# Patient Record
Sex: FEMALE | Race: WHITE | NOT HISPANIC OR LATINO | Employment: FULL TIME | URBAN - NONMETROPOLITAN AREA
[De-identification: names, ages, dates, MRNs, and addresses within clinical notes are randomized per-mention and may not be internally consistent; named-entity substitution may affect disease eponyms.]

---

## 2018-11-02 ENCOUNTER — HOSPITAL ENCOUNTER (OUTPATIENT)
Dept: CT IMAGING | Facility: HOSPITAL | Age: 48
Discharge: HOME | End: 2018-11-02
Admitting: NURSE PRACTITIONER

## 2018-11-02 ENCOUNTER — OFFICE VISIT (OUTPATIENT)
Dept: GASTROENTEROLOGY | Facility: CLINIC | Age: 48
End: 2018-11-02

## 2018-11-02 VITALS
WEIGHT: 293 LBS | DIASTOLIC BLOOD PRESSURE: 82 MMHG | BODY MASS INDEX: 44.41 KG/M2 | SYSTOLIC BLOOD PRESSURE: 152 MMHG | HEART RATE: 66 BPM | OXYGEN SATURATION: 96 % | HEIGHT: 68 IN

## 2018-11-02 DIAGNOSIS — R10.12 LEFT UPPER QUADRANT PAIN: Primary | ICD-10-CM

## 2018-11-02 DIAGNOSIS — R11.0 NAUSEA: ICD-10-CM

## 2018-11-02 DIAGNOSIS — R10.12 LEFT UPPER QUADRANT PAIN: ICD-10-CM

## 2018-11-02 DIAGNOSIS — K52.9 CHRONIC DIARRHEA: ICD-10-CM

## 2018-11-02 PROCEDURE — 74150 CT ABDOMEN W/O CONTRAST: CPT

## 2018-11-02 PROCEDURE — 99203 OFFICE O/P NEW LOW 30 MIN: CPT | Performed by: NURSE PRACTITIONER

## 2018-11-02 RX ORDER — BECLOMETHASONE DIPROPIONATE HFA 80 UG/1
2 AEROSOL, METERED RESPIRATORY (INHALATION)
COMMUNITY
Start: 2018-10-23 | End: 2020-03-17

## 2018-11-02 RX ORDER — DEXTROSE AND SODIUM CHLORIDE 5; .45 G/100ML; G/100ML
30 INJECTION, SOLUTION INTRAVENOUS CONTINUOUS PRN
Status: CANCELLED | OUTPATIENT
Start: 2018-12-04

## 2018-11-02 RX ORDER — OMEPRAZOLE 40 MG/1
40 CAPSULE, DELAYED RELEASE ORAL DAILY
Qty: 30 CAPSULE | Refills: 11 | Status: SHIPPED | OUTPATIENT
Start: 2018-11-02 | End: 2019-09-17 | Stop reason: CLARIF

## 2018-11-02 RX ORDER — SODIUM, POTASSIUM,MAG SULFATES 17.5-3.13G
1 SOLUTION, RECONSTITUTED, ORAL ORAL EVERY 12 HOURS
Qty: 2 BOTTLE | Refills: 0 | Status: ON HOLD | OUTPATIENT
Start: 2018-11-02 | End: 2018-12-04

## 2018-11-02 RX ORDER — ALBUTEROL SULFATE 90 UG/1
2 AEROSOL, METERED RESPIRATORY (INHALATION) EVERY 4 HOURS PRN
COMMUNITY
Start: 2018-10-23

## 2018-11-02 NOTE — PROGRESS NOTES
Chief Complaint   Patient presents with   • Abdominal Pain   • Vomiting     blood   • Diarrhea     chronic       Subjective    Caprice Payal Sinclair is a 47 y.o. female. she is here today  47-year-old female presents for sudden onset left upper quadrant pain that started 2 weeks ago while at work.  States it was severe she felt like a baby was kicking through her abdomen and she vomited mucousy blood material and then went on with her day.  Symptoms have persisted they are worse when sitting up and feel probable not when sitting up.  States several years ago she tried Prilosec however did not make an improvement and her symptoms were not the same.  She also reports chronic diarrhea that occurs after any intake so she typically does not eat throughout the day.  However due to pain she has not been eating much at night.  Do not have previous office notes to see weight change though patient states she has lost a few pounds.  Denies any fever.    Abdominal Pain   This is a new problem. The current episode started 1 to 4 weeks ago (2 weeks ago ). The onset quality is sudden. The problem occurs daily. The problem has been waxing and waning. The pain is located in the LUQ. The pain is severe. The quality of the pain is sharp and burning. Associated symptoms include anorexia, diarrhea (chronic ), nausea and vomiting (bilious with blood once at onset ). Pertinent negatives include no arthralgias, belching, constipation, dysuria, fever, flatus, frequency, headaches, hematochezia, hematuria, melena, myalgias or weight loss. The pain is aggravated by movement. The pain is relieved by nothing. She has tried nothing for the symptoms.   Vomiting    Associated symptoms include abdominal pain and diarrhea (chronic ). Pertinent negatives include no arthralgias, chills, fever, headaches, myalgias or weight loss.   Diarrhea    Associated symptoms include abdominal pain and vomiting (bilious with blood once at onset ). Pertinent negatives  include no arthralgias, chills, fever, headaches, increased  flatus, myalgias or weight loss.   Plan; CT abdomen and pelvis without IV contrast due to allergy or oral contrast.  CBC, CMP and EGD and colonoscopy due to nausea, left upper abdominal pain, hematemesis and diarrhea.  Follow-up after test return to office sooner if needed.     The following portions of the patient's history were reviewed and updated as appropriate:   No past medical history on file.  Past Surgical History:   Procedure Laterality Date   • BACK SURGERY  1992   • HYSTERECTOMY  1991   • TONSILECTOMY, ADENOIDECTOMY, BILATERAL MYRINGOTOMY AND TUBES  1992     Family History   Problem Relation Age of Onset   • No Known Problems Mother    • Alcohol abuse Father    • No Known Problems Sister    • No Known Problems Brother    • No Known Problems Maternal Aunt    • No Known Problems Maternal Uncle    • No Known Problems Paternal Aunt    • No Known Problems Paternal Uncle    • No Known Problems Maternal Grandmother    • No Known Problems Maternal Grandfather    • No Known Problems Paternal Grandmother    • No Known Problems Paternal Grandfather      OB History     No data available        Current Outpatient Prescriptions   Medication Sig Dispense Refill   • omeprazole (priLOSEC) 40 MG capsule Take 1 capsule by mouth Daily. 30 capsule 11   • QVAR REDIHALER 80 MCG/ACT inhaler      • VENTOLIN  (90 Base) MCG/ACT inhaler        No current facility-administered medications for this visit.      Allergies   Allergen Reactions   • Contrast Dye Anaphylaxis     Cardiac arrest      Social History     Social History   • Marital status: Single     Social History Main Topics   • Smoking status: Current Every Day Smoker     Packs/day: 1.00     Start date: 11/2/1988   • Smokeless tobacco: Never Used   • Alcohol use No   • Drug use: No   • Sexual activity: Defer     Other Topics Concern   • Not on file       Review of Systems  Review of Systems   Constitutional:  "Positive for activity change, appetite change, fatigue and unexpected weight change. Negative for chills, diaphoresis, fever and weight loss.   HENT: Negative for trouble swallowing.    Gastrointestinal: Positive for abdominal distention, abdominal pain, anorexia, diarrhea (chronic ), nausea and vomiting (bilious with blood once at onset ). Negative for anal bleeding, blood in stool, constipation, flatus, hematochezia, melena and rectal pain.   Genitourinary: Negative for dysuria, frequency and hematuria.   Musculoskeletal: Negative for arthralgias and myalgias.   Neurological: Negative for headaches.        /82 (BP Location: Left arm)   Pulse 66   Ht 172.7 cm (68\")   Wt 134 kg (296 lb 3.2 oz)   SpO2 96%   BMI 45.04 kg/m²     Objective    Physical Exam   Constitutional: She is oriented to person, place, and time. She appears well-developed and well-nourished. She is cooperative. No distress.   HENT:   Head: Normocephalic and atraumatic.   Neck: Normal range of motion. Neck supple. No thyromegaly present.   Cardiovascular: Normal rate, regular rhythm and normal heart sounds.    Pulmonary/Chest: Effort normal and breath sounds normal. She has no wheezes. She has no rhonchi. She has no rales.   Abdominal: Soft. Normal appearance and bowel sounds are normal. She exhibits no distension. There is no hepatosplenomegaly. There is tenderness in the left upper quadrant. There is no rigidity and no guarding. No hernia.   Fullness left upper quadrant    Lymphadenopathy:     She has no cervical adenopathy.   Neurological: She is alert and oriented to person, place, and time.   Skin: Skin is warm, dry and intact. No rash noted. No pallor.   Psychiatric: She has a normal mood and affect. Her speech is normal.     No results found for any previous visit.     Assessment/Plan      1. Left upper quadrant pain    2. Nausea    3. Chronic diarrhea    .       Orders placed during this encounter include:  Orders Placed This " Encounter   Procedures   • CT Abdomen Without Contrast     Standing Status:   Future     Standing Expiration Date:   11/2/2019     Order Specific Question:   Patient Pregnant     Answer:   No     Order Specific Question:   Will Oral Contrast be needed for this procedure?     Answer:   Yes   • Comprehensive Metabolic Panel   • Follow Anesthesia Guidelines / Standing Orders     Standing Status:   Future   • CBC & Differential     Order Specific Question:   Manual Differential     Answer:   No       ESOPHAGOGASTRODUODENOSCOPY (N/A), COLONOSCOPY (N/A)    Review and/or summary of lab tests, radiology, procedures, medications. Review and summary of old records and obtaining of history. The risks and benefits of my recommendations, as well as other treatment options were discussed with the patient today. Questions were answered.    New Medications Ordered This Visit   Medications   • omeprazole (priLOSEC) 40 MG capsule     Sig: Take 1 capsule by mouth Daily.     Dispense:  30 capsule     Refill:  11       Follow-up: Return in about 2 weeks (around 11/16/2018).          This document has been electronically signed by AUTUMN Bang on November 2, 2018 8:25 AM             No results found for this or any previous visit.

## 2018-11-02 NOTE — PATIENT INSTRUCTIONS
Chronic Diarrhea  Diarrhea is a condition in which a person passes frequent loose and watery stools. It can cause you to feel weak and dehydrated. Dehydration can make you tired and thirsty. It can also cause a dry mouth, decreased urination, and dark yellow urine. Diarrhea is a sign of another underlying problem, such as:  · Infection.  · Medication side effects.  · Dietary intolerance, such as lactose intolerance.  · Conditions such as celiac disease, irritable bowel syndrome (IBS), or inflammatory bowel disease (IBD).    In most cases, diarrhea lasts 2-3 days. Diarrhea that lasts longer than 4 weeks is called long-lasting (chronic) diarrhea. It is important that you treat your diarrhea as told by your health care provider.  Follow these instructions at home:  Follow these recommendations as told by your health care provider.  Eating and drinking  · Take an oral rehydration solution (ORS). This is a drink that is designed to keep you hydrated. It can be found at pharmacies and retail stores.  · Drink clear fluids, such as water, ice chips, diluted fruit juice, and low-calorie sports drinks.  · Follow the diet recommended by your health care provider. You may need to avoid foods that trigger diarrhea for you.  · Avoid foods and beverages that contain a lot of sugar or caffeine.  · Avoid alcohol.  · Avoid spicy or fatty foods.  General instructions  · Drink enough fluid to keep your urine clear or pale yellow.  · Wash your hands often and after each diarrhea episode. If soap and water are not available, use hand .  · Make sure that all people in your household wash their hands well and often.  · Take over-the-counter and prescription medicines only as told by your health care provider.  · If you were prescribed an antibiotic medicine, take it as told by your health care provider. Do not stop taking the antibiotic even if you start to feel better.  · Rest at home while you recover.  · Watch your condition  for any changes.  · Take a warm bath to relieve any burning or pain from frequent diarrhea episodes.  · Keep all follow-up visits as told by your health care provider. This is important.  Contact a health care provider if:  · You have a fever.  · Your diarrhea gets worse or does not get better.  · You have new symptoms.  · You cannot drink fluids without vomiting.  · You feel light-headed or dizzy.  · You have a headache.  · You have muscle cramps.  · You have severe pain in the rectum.  Get help right away if:  · You have persistent vomiting.  · You have chest pain.  · You feel extremely weak or you faint.  · You have bloody or black stools, or stools that look like tar.  · You have severe pain, cramping, or bloating in your abdomen, or pain that stays in one place.  · You have trouble breathing or you are breathing very quickly.  · Your heart is beating very quickly.  · Your skin feels cold and clammy.  · You feel confused.  · You have a severe headache.  · You have signs of dehydration, such as:  ? Dark urine, very little urine, or no urine.  ? Cracked lips.  ? Dry mouth.  ? Sunken eyes.  ? Sleepiness.  ? Weakness.  Summary  · Chronic diarrhea is a condition in which a person passes frequent loose and watery stools for more than 4 weeks.  · Diarrhea is a sign of another underlying problem.  · Drink enough fluid to keep your urine clear or pale yellow to avoid dehydration.  · Wash your hands often and after each diarrhea episode. If soap and water are not available, use hand .  · It is important that you treat your diarrhea as told by your health care provider.  This information is not intended to replace advice given to you by your health care provider. Make sure you discuss any questions you have with your health care provider.  Document Released: 03/09/2005 Document Revised: 11/06/2017 Document Reviewed: 11/06/2017  Elsevier Interactive Patient Education © 2017 [employer]

## 2018-11-07 ENCOUNTER — TELEPHONE (OUTPATIENT)
Dept: GASTROENTEROLOGY | Facility: CLINIC | Age: 48
End: 2018-11-07

## 2018-12-04 ENCOUNTER — ANESTHESIA (OUTPATIENT)
Dept: GASTROENTEROLOGY | Facility: HOSPITAL | Age: 48
End: 2018-12-04

## 2018-12-04 ENCOUNTER — HOSPITAL ENCOUNTER (OUTPATIENT)
Facility: HOSPITAL | Age: 48
Setting detail: HOSPITAL OUTPATIENT SURGERY
Discharge: HOME | End: 2018-12-04
Attending: INTERNAL MEDICINE | Admitting: INTERNAL MEDICINE

## 2018-12-04 ENCOUNTER — ANESTHESIA EVENT (OUTPATIENT)
Dept: GASTROENTEROLOGY | Facility: HOSPITAL | Age: 48
End: 2018-12-04

## 2018-12-04 VITALS
OXYGEN SATURATION: 95 % | RESPIRATION RATE: 20 BRPM | TEMPERATURE: 96.8 F | SYSTOLIC BLOOD PRESSURE: 118 MMHG | HEART RATE: 65 BPM | BODY MASS INDEX: 43.5 KG/M2 | DIASTOLIC BLOOD PRESSURE: 78 MMHG | HEIGHT: 68 IN | WEIGHT: 287 LBS

## 2018-12-04 DIAGNOSIS — K52.9 CHRONIC DIARRHEA: ICD-10-CM

## 2018-12-04 DIAGNOSIS — R10.12 LEFT UPPER QUADRANT PAIN: ICD-10-CM

## 2018-12-04 DIAGNOSIS — R11.0 NAUSEA: ICD-10-CM

## 2018-12-04 LAB — KOH PREP NAIL: NORMAL

## 2018-12-04 PROCEDURE — 45380 COLONOSCOPY AND BIOPSY: CPT | Performed by: INTERNAL MEDICINE

## 2018-12-04 PROCEDURE — 43239 EGD BIOPSY SINGLE/MULTIPLE: CPT | Performed by: INTERNAL MEDICINE

## 2018-12-04 PROCEDURE — 88305 TISSUE EXAM BY PATHOLOGIST: CPT | Performed by: INTERNAL MEDICINE

## 2018-12-04 PROCEDURE — 25010000002 MIDAZOLAM PER 1 MG: Performed by: NURSE ANESTHETIST, CERTIFIED REGISTERED

## 2018-12-04 PROCEDURE — 88305 TISSUE EXAM BY PATHOLOGIST: CPT | Performed by: PATHOLOGY

## 2018-12-04 PROCEDURE — 25010000002 PROPOFOL 10 MG/ML EMULSION: Performed by: NURSE ANESTHETIST, CERTIFIED REGISTERED

## 2018-12-04 PROCEDURE — 87220 TISSUE EXAM FOR FUNGI: CPT | Performed by: INTERNAL MEDICINE

## 2018-12-04 RX ORDER — LIDOCAINE HYDROCHLORIDE 10 MG/ML
INJECTION, SOLUTION INFILTRATION; PERINEURAL AS NEEDED
Status: DISCONTINUED | OUTPATIENT
Start: 2018-12-04 | End: 2018-12-04 | Stop reason: SURG

## 2018-12-04 RX ORDER — PROMETHAZINE HYDROCHLORIDE 25 MG/ML
12.5 INJECTION, SOLUTION INTRAMUSCULAR; INTRAVENOUS ONCE AS NEEDED
Status: DISCONTINUED | OUTPATIENT
Start: 2018-12-04 | End: 2018-12-04 | Stop reason: HOSPADM

## 2018-12-04 RX ORDER — DEXAMETHASONE SODIUM PHOSPHATE 4 MG/ML
8 INJECTION, SOLUTION INTRA-ARTICULAR; INTRALESIONAL; INTRAMUSCULAR; INTRAVENOUS; SOFT TISSUE ONCE AS NEEDED
Status: DISCONTINUED | OUTPATIENT
Start: 2018-12-04 | End: 2018-12-04 | Stop reason: HOSPADM

## 2018-12-04 RX ORDER — MIDAZOLAM HYDROCHLORIDE 1 MG/ML
INJECTION INTRAMUSCULAR; INTRAVENOUS AS NEEDED
Status: DISCONTINUED | OUTPATIENT
Start: 2018-12-04 | End: 2018-12-04 | Stop reason: SURG

## 2018-12-04 RX ORDER — DEXTROSE AND SODIUM CHLORIDE 5; .45 G/100ML; G/100ML
30 INJECTION, SOLUTION INTRAVENOUS CONTINUOUS PRN
Status: DISCONTINUED | OUTPATIENT
Start: 2018-12-04 | End: 2018-12-04 | Stop reason: HOSPADM

## 2018-12-04 RX ORDER — PROPOFOL 10 MG/ML
VIAL (ML) INTRAVENOUS AS NEEDED
Status: DISCONTINUED | OUTPATIENT
Start: 2018-12-04 | End: 2018-12-04 | Stop reason: SURG

## 2018-12-04 RX ORDER — ONDANSETRON 2 MG/ML
4 INJECTION INTRAMUSCULAR; INTRAVENOUS ONCE AS NEEDED
Status: DISCONTINUED | OUTPATIENT
Start: 2018-12-04 | End: 2018-12-04 | Stop reason: HOSPADM

## 2018-12-04 RX ORDER — PROMETHAZINE HYDROCHLORIDE 25 MG/1
25 TABLET ORAL ONCE AS NEEDED
Status: DISCONTINUED | OUTPATIENT
Start: 2018-12-04 | End: 2018-12-04 | Stop reason: HOSPADM

## 2018-12-04 RX ORDER — FLUCONAZOLE 100 MG/1
100 TABLET ORAL DAILY
Qty: 22 TABLET | Refills: 0 | Status: SHIPPED | OUTPATIENT
Start: 2018-12-04 | End: 2019-08-20

## 2018-12-04 RX ORDER — PROMETHAZINE HYDROCHLORIDE 25 MG/1
25 SUPPOSITORY RECTAL ONCE AS NEEDED
Status: DISCONTINUED | OUTPATIENT
Start: 2018-12-04 | End: 2018-12-04 | Stop reason: HOSPADM

## 2018-12-04 RX ADMIN — DEXTROSE AND SODIUM CHLORIDE 30 ML/HR: 5; 450 INJECTION, SOLUTION INTRAVENOUS at 14:03

## 2018-12-04 RX ADMIN — PROPOFOL 50 MG: 10 INJECTION, EMULSION INTRAVENOUS at 15:43

## 2018-12-04 RX ADMIN — PROPOFOL 50 MG: 10 INJECTION, EMULSION INTRAVENOUS at 15:57

## 2018-12-04 RX ADMIN — PROPOFOL 70 MG: 10 INJECTION, EMULSION INTRAVENOUS at 15:49

## 2018-12-04 RX ADMIN — PROPOFOL 60 MG: 10 INJECTION, EMULSION INTRAVENOUS at 15:36

## 2018-12-04 RX ADMIN — MIDAZOLAM HYDROCHLORIDE 2 MG: 2 INJECTION, SOLUTION INTRAMUSCULAR; INTRAVENOUS at 15:10

## 2018-12-04 RX ADMIN — PROPOFOL 40 MG: 10 INJECTION, EMULSION INTRAVENOUS at 16:01

## 2018-12-04 RX ADMIN — PROPOFOL 40 MG: 10 INJECTION, EMULSION INTRAVENOUS at 15:38

## 2018-12-04 RX ADMIN — PROPOFOL 60 MG: 10 INJECTION, EMULSION INTRAVENOUS at 15:53

## 2018-12-04 RX ADMIN — PROPOFOL 50 MG: 10 INJECTION, EMULSION INTRAVENOUS at 15:40

## 2018-12-04 RX ADMIN — LIDOCAINE HYDROCHLORIDE 100 MG: 10 INJECTION, SOLUTION INFILTRATION; PERINEURAL at 15:36

## 2018-12-04 NOTE — ANESTHESIA PREPROCEDURE EVALUATION
Anesthesia Evaluation     Patient summary reviewed and Nursing notes reviewed   NPO Solid Status: > 8 hours  NPO Liquid Status: > 4 hours           Airway   Mallampati: I  TM distance: >3 FB  Neck ROM: full  No difficulty expected  Dental - normal exam     Pulmonary - normal exam   (+) a smoker Current Abstained day of surgery, asthma,   Cardiovascular - normal exam        Neuro/Psych  GI/Hepatic/Renal/Endo    (+) morbid obesity, GERD,      ROS Comment: Diarrhea    Musculoskeletal     Abdominal   (+) obese,    Substance History      OB/GYN          Other                        Anesthesia Plan    ASA 3     MAC     intravenous induction   Anesthetic plan, all risks, benefits, and alternatives have been provided, discussed and informed consent has been obtained with: patient.

## 2018-12-04 NOTE — H&P
Jacquie Corrales APRN   Nurse Practitioner   Gastroenterology   Progress Notes   Signed   Encounter Date:  11/2/2018          Related encounter: Office Visit from 11/2/2018 in [facility] GASTR[facility] with Jacquie Corrales APRN         Signed        Expand All Collapse All            Show:Clear all  [x]Manual[x]Template[]Copied    Added by:  [x]Jacquie Corrales APRN      []Hover for details      Chief Complaint   Patient presents with   • Abdominal Pain   • Vomiting       blood   • Diarrhea       chronic            Subjective       Caprice Payal Sinclair is a 47 y.o. female. she is here today  47-year-old female presents for sudden onset left upper quadrant pain that started 2 weeks ago while at work.  States it was severe she felt like a baby was kicking through her abdomen and she vomited mucousy blood material and then went on with her day.  Symptoms have persisted they are worse when sitting up and feel probable not when sitting up.  States several years ago she tried Prilosec however did not make an improvement and her symptoms were not the same.  She also reports chronic diarrhea that occurs after any intake so she typically does not eat throughout the day.  However due to pain she has not been eating much at night.  Do not have previous office notes to see weight change though patient states she has lost a few pounds.  Denies any fever.     Abdominal Pain   This is a new problem. The current episode started 1 to 4 weeks ago (2 weeks ago ). The onset quality is sudden. The problem occurs daily. The problem has been waxing and waning. The pain is located in the LUQ. The pain is severe. The quality of the pain is sharp and burning. Associated symptoms include anorexia, diarrhea (chronic ), nausea and vomiting (bilious with blood once at onset ). Pertinent negatives include no arthralgias, belching, constipation, dysuria, fever, flatus, frequency, headaches, hematochezia, hematuria, melena, myalgias or  weight loss. The pain is aggravated by movement. The pain is relieved by nothing. She has tried nothing for the symptoms.   Vomiting    Associated symptoms include abdominal pain and diarrhea (chronic ). Pertinent negatives include no arthralgias, chills, fever, headaches, myalgias or weight loss.   Diarrhea    Associated symptoms include abdominal pain and vomiting (bilious with blood once at onset ). Pertinent negatives include no arthralgias, chills, fever, headaches, increased  flatus, myalgias or weight loss.   Plan; CT abdomen and pelvis without IV contrast due to allergy or oral contrast.  CBC, CMP and EGD and colonoscopy due to nausea, left upper abdominal pain, hematemesis and diarrhea.  Follow-up after test return to office sooner if needed.     The following portions of the patient's history were reviewed and updated as appropriate:   Medical History   No past medical history on file.     Surgical History         Past Surgical History:   Procedure Laterality Date   • BACK SURGERY   1992   • HYSTERECTOMY   1991   • TONSILECTOMY, ADENOIDECTOMY, BILATERAL MYRINGOTOMY AND TUBES   1992               Family History   Problem Relation Age of Onset   • No Known Problems Mother     • Alcohol abuse Father     • No Known Problems Sister     • No Known Problems Brother     • No Known Problems Maternal Aunt     • No Known Problems Maternal Uncle     • No Known Problems Paternal Aunt     • No Known Problems Paternal Uncle     • No Known Problems Maternal Grandmother     • No Known Problems Maternal Grandfather     • No Known Problems Paternal Grandmother     • No Known Problems Paternal Grandfather            OB History      No data available          Current Medications          Current Outpatient Prescriptions   Medication Sig Dispense Refill   • omeprazole (priLOSEC) 40 MG capsule Take 1 capsule by mouth Daily. 30 capsule 11   • QVAR REDIHALER 80 MCG/ACT inhaler         • VENTOLIN  (90 Base) MCG/ACT inhaler    "         No current facility-administered medications for this visit.                Allergies   Allergen Reactions   • Contrast Dye Anaphylaxis       Cardiac arrest       Social History   Social History           Social History   • Marital status: Single            Social History Main Topics   • Smoking status: Current Every Day Smoker       Packs/day: 1.00       Start date: 11/2/1988   • Smokeless tobacco: Never Used   • Alcohol use No   • Drug use: No   • Sexual activity: Defer           Other Topics Concern   • Not on file            Review of Systems  Review of Systems   Constitutional: Positive for activity change, appetite change, fatigue and unexpected weight change. Negative for chills, diaphoresis, fever and weight loss.   HENT: Negative for trouble swallowing.    Gastrointestinal: Positive for abdominal distention, abdominal pain, anorexia, diarrhea (chronic ), nausea and vomiting (bilious with blood once at onset ). Negative for anal bleeding, blood in stool, constipation, flatus, hematochezia, melena and rectal pain.   Genitourinary: Negative for dysuria, frequency and hematuria.   Musculoskeletal: Negative for arthralgias and myalgias.   Neurological: Negative for headaches.                    /82 (BP Location: Left arm)   Pulse 66   Ht 172.7 cm (68\")   Wt 134 kg (296 lb 3.2 oz)   SpO2 96%   BMI 45.04 kg/m²           Objective       Physical Exam   Constitutional: She is oriented to person, place, and time. She appears well-developed and well-nourished. She is cooperative. No distress.   HENT:   Head: Normocephalic and atraumatic.   Neck: Normal range of motion. Neck supple. No thyromegaly present.   Cardiovascular: Normal rate, regular rhythm and normal heart sounds.    Pulmonary/Chest: Effort normal and breath sounds normal. She has no wheezes. She has no rhonchi. She has no rales.   Abdominal: Soft. Normal appearance and bowel sounds are normal. She exhibits no distension. There is no " hepatosplenomegaly. There is tenderness in the left upper quadrant. There is no rigidity and no guarding. No hernia.   Fullness left upper quadrant    Lymphadenopathy:     She has no cervical adenopathy.   Neurological: She is alert and oriented to person, place, and time.   Skin: Skin is warm, dry and intact. No rash noted. No pallor.   Psychiatric: She has a normal mood and affect. Her speech is normal.      No results found for any previous visit.           Assessment/Plan          1. Left upper quadrant pain    2. Nausea    3. Chronic diarrhea    .         Orders placed during this encounter include:        Orders Placed This Encounter   Procedures   • CT Abdomen Without Contrast       Standing Status:   Future       Standing Expiration Date:   11/2/2019       Order Specific Question:   Patient Pregnant       Answer:   No       Order Specific Question:   Will Oral Contrast be needed for this procedure?       Answer:   Yes   • Comprehensive Metabolic Panel   • Follow Anesthesia Guidelines / Standing Orders       Standing Status:   Future   • CBC & Differential       Order Specific Question:   Manual Differential       Answer:   No         ESOPHAGOGASTRODUODENOSCOPY (N/A), COLONOSCOPY (N/A)     Review and/or summary of lab tests, radiology, procedures, medications. Review and summary of old records and obtaining of history. The risks and benefits of my recommendations, as well as other treatment options were discussed with the patient today. Questions were answered.          New Medications Ordered This Visit   Medications   • omeprazole (priLOSEC) 40 MG capsule       Sig: Take 1 capsule by mouth Daily.       Dispense:  30 capsule       Refill:  11         Follow-up: Return in about 2 weeks (around 11/16/2018).             This document has been electronically signed by AUTUMN Bang on November 2, 2018 8:25 AM

## 2018-12-04 NOTE — ANESTHESIA POSTPROCEDURE EVALUATION
Patient: Caprice Sinclair    Procedure Summary     Date:  12/04/18 Room / Location:  [facility] ENDOSCOPY 3 / [facility] ENDOSCOPY    Anesthesia Start:  1534 Anesthesia Stop:  1606    Procedures:       ESOPHAGOGASTRODUODENOSCOPY (N/A )      COLONOSCOPY (N/A ) Diagnosis:       Nausea      Chronic diarrhea      Left upper quadrant pain      (Nausea [R11.0])      (Chronic diarrhea [K52.9])      (Left upper quadrant pain [R10.12])    Surgeon:  Jaiyeola, Aderemi, MD Provider:  Lorenza Millan CRNA    Anesthesia Type:  MAC ASA Status:  3          Anesthesia Type: MAC  Last vitals  BP   125/70 (12/04/18 1357)   Temp   97.9 °F (36.6 °C) (12/04/18 1357)   Pulse   69 (12/04/18 1357)   Resp   18 (12/04/18 1357)     SpO2   97 % (12/04/18 1357)     Post Anesthesia Care and Evaluation    Patient location during evaluation: bedside  Patient participation: complete - patient participated  Level of consciousness: awake  Pain score: 0  Pain management: adequate  Airway patency: patent  Anesthetic complications: No anesthetic complications  PONV Status: none  Cardiovascular status: acceptable  Respiratory status: acceptable  Hydration status: acceptable

## 2018-12-06 LAB
LAB AP CASE REPORT: NORMAL
PATH REPORT.FINAL DX SPEC: NORMAL
PATH REPORT.GROSS SPEC: NORMAL

## 2018-12-11 ENCOUNTER — OFFICE VISIT (OUTPATIENT)
Dept: GASTROENTEROLOGY | Facility: CLINIC | Age: 48
End: 2018-12-11

## 2018-12-11 VITALS
HEIGHT: 68 IN | DIASTOLIC BLOOD PRESSURE: 88 MMHG | SYSTOLIC BLOOD PRESSURE: 136 MMHG | HEART RATE: 57 BPM | BODY MASS INDEX: 44.41 KG/M2 | OXYGEN SATURATION: 98 % | WEIGHT: 293 LBS

## 2018-12-11 DIAGNOSIS — K52.9 CHRONIC DIARRHEA: Primary | ICD-10-CM

## 2018-12-11 DIAGNOSIS — K29.50 CHRONIC GASTRITIS WITHOUT BLEEDING, UNSPECIFIED GASTRITIS TYPE: ICD-10-CM

## 2018-12-11 DIAGNOSIS — K21.9 GASTROESOPHAGEAL REFLUX DISEASE WITHOUT ESOPHAGITIS: ICD-10-CM

## 2018-12-11 DIAGNOSIS — K58.0 IRRITABLE BOWEL SYNDROME WITH DIARRHEA: ICD-10-CM

## 2018-12-11 PROCEDURE — 99214 OFFICE O/P EST MOD 30 MIN: CPT | Performed by: NURSE PRACTITIONER

## 2018-12-11 RX ORDER — DICYCLOMINE HCL 20 MG
20 TABLET ORAL
Qty: 90 TABLET | Refills: 5 | Status: SHIPPED | OUTPATIENT
Start: 2018-12-11 | End: 2019-01-10

## 2018-12-11 RX ORDER — PREDNISONE 20 MG/1
TABLET ORAL
COMMUNITY
Start: 2018-12-10 | End: 2019-09-17

## 2018-12-11 RX ORDER — HYDROXYZINE HYDROCHLORIDE 10 MG/1
TABLET, FILM COATED ORAL
COMMUNITY
Start: 2018-12-10

## 2018-12-11 RX ORDER — AMOXICILLIN AND CLAVULANATE POTASSIUM 875; 125 MG/1; MG/1
TABLET, FILM COATED ORAL
COMMUNITY
Start: 2018-12-10 | End: 2019-08-20

## 2018-12-11 RX ORDER — FLUTICASONE PROPIONATE 50 MCG
SPRAY, SUSPENSION (ML) NASAL
COMMUNITY
Start: 2018-12-10

## 2018-12-11 NOTE — PROGRESS NOTES
Chief Complaint   Patient presents with   • Abdominal Pain   • Vomiting   • Diarrhea       Subjective    Caprice Payal Sinclair is a 48 y.o. female. she is here today for follow-up.    History of Present Illness   48-year-old female presents for EGD and colonoscopy results.  Initially seen due to upper abdominal pain associated with nausea vomiting and chronic diarrhea.  States abdominal pain is occurring, described as cramping however denies any vomiting just intermittent nausea and still chronic diarrhea.  She underwent CT which showed no acute abnormality.  Diverticulosis was seen.  EGD noted white plaques middle third of the esophagus creeping for KOH prep was done which was negative.  Gastritis was seen in the stomach hiatal hernia was present due to tinnitus was seen.  Antral biopsy noted reactive gastropathy.  Distal duodenal biopsy noted no significant amount 2.  Colonoscopy had adequate prep and noted normal terminal ileum.  Many small and large mouth diverticular found the sigmoid colon plan.  Nonbleeding hemorrhoids were found.  Random colonic biopsy noted no significant histologic abnormality.  Patient has tried Imodium for chronic diarrhea which has not improved symptoms at all.  Reports she has a bowel movement more times than she urinates daily.    Plan; recommended fiber supplementation daily for diverticulosis.  Repeat colonoscopy in 10 years for surveillance.  Will start patient on dicyclomine for abdominal cramping as needed.  Recheck in 3 months return to office sooner if needed.     The following portions of the patient's history were reviewed and updated as appropriate:   No past medical history on file.  Past Surgical History:   Procedure Laterality Date   • BACK SURGERY  1992   • HYSTERECTOMY  1991   • TONSILECTOMY, ADENOIDECTOMY, BILATERAL MYRINGOTOMY AND TUBES  1992     Family History   Problem Relation Age of Onset   • No Known Problems Mother    • Alcohol abuse Father    • No Known Problems  Sister    • No Known Problems Brother    • No Known Problems Maternal Aunt    • No Known Problems Maternal Uncle    • No Known Problems Paternal Aunt    • No Known Problems Paternal Uncle    • No Known Problems Maternal Grandmother    • No Known Problems Maternal Grandfather    • No Known Problems Paternal Grandmother    • No Known Problems Paternal Grandfather      OB History     No data available        Current Outpatient Medications   Medication Sig Dispense Refill   • amoxicillin-clavulanate (AUGMENTIN) 875-125 MG per tablet      • fluconazole (DIFLUCAN) 100 MG tablet Take 1 tablet by mouth Daily. Take two tablet first dose on day one. Then take one tablet once daily for 20 days. 22 tablet 0   • fluticasone (FLONASE) 50 MCG/ACT nasal spray      • hydrOXYzine (ATARAX) 10 MG tablet      • omeprazole (priLOSEC) 40 MG capsule Take 1 capsule by mouth Daily. 30 capsule 11   • predniSONE (DELTASONE) 20 MG tablet      • QVAR REDIHALER 80 MCG/ACT inhaler Inhale 2 puffs 2 (Two) Times a Day.     • VENTOLIN  (90 Base) MCG/ACT inhaler Inhale 2 puffs Every 4 (Four) Hours As Needed.     • dicyclomine (BENTYL) 20 MG tablet Take 1 tablet by mouth 4 (Four) Times a Day Before Meals & at Bedtime As Needed (cramping) for up to 30 days. 90 tablet 5     No current facility-administered medications for this visit.      Allergies   Allergen Reactions   • Contrast Dye Anaphylaxis     Cardiac arrest      Social History     Socioeconomic History   • Marital status: Single     Spouse name: Not on file   • Number of children: Not on file   • Years of education: Not on file   • Highest education level: Not on file   Tobacco Use   • Smoking status: Current Every Day Smoker     Packs/day: 1.00     Start date: 11/2/1988   • Smokeless tobacco: Never Used   Substance and Sexual Activity   • Alcohol use: No   • Drug use: No   • Sexual activity: Defer       Review of Systems  Review of Systems   Constitutional: Negative for activity change,  "appetite change, chills, diaphoresis, fatigue, fever and unexpected weight change.   HENT: Negative for sore throat and trouble swallowing.    Respiratory: Negative for shortness of breath.    Gastrointestinal: Positive for abdominal pain (intermittent not changed), diarrhea (8-10 if not more ) and nausea (some ). Negative for abdominal distention, anal bleeding, blood in stool, constipation, rectal pain and vomiting.   Musculoskeletal: Negative for arthralgias.   Skin: Negative for pallor.   Neurological: Negative for light-headedness.        /88 (BP Location: Left arm)   Pulse 57   Ht 172.7 cm (68\")   Wt 135 kg (297 lb 12.8 oz)   SpO2 98%   BMI 45.28 kg/m²     Objective    Physical Exam   Constitutional: She is oriented to person, place, and time. She appears well-developed and well-nourished. She is cooperative. No distress.   HENT:   Head: Normocephalic and atraumatic.   Neck: Normal range of motion. Neck supple. No thyromegaly present.   Cardiovascular: Normal rate, regular rhythm and normal heart sounds.   Pulmonary/Chest: Effort normal and breath sounds normal. She has no wheezes. She has no rhonchi. She has no rales.   Abdominal: Soft. Normal appearance and bowel sounds are normal. She exhibits no distension. There is no hepatosplenomegaly. There is generalized tenderness. There is no rigidity and no guarding. No hernia.   Lymphadenopathy:     She has no cervical adenopathy.   Neurological: She is alert and oriented to person, place, and time.   Skin: Skin is warm, dry and intact. No rash noted. No pallor.   Psychiatric: She has a normal mood and affect. Her speech is normal.     Admission on 12/04/2018, Discharged on 12/04/2018   Component Date Value Ref Range Status   • KOH Prep 12/04/2018 No yeast or hyphal elements seen  No yeast or hyphal elements seen Final   • Case Report 12/04/2018    Final                    Value:Surgical Pathology Report                         Case: [id number]          "                         Authorizing Provider:  Jaiyeola, Aderemi, MD      Collected:           12/04/2018 03:49 PM          Ordering Location:     [facility]             Received:            12/05/2018 07:51 AM                                 [facility]                                                     Pathologist:           Rian Brown MD                                                         Specimens:   1) - Small Intestine, Duodenum                                                                      2) - Gastric, Antrum, and body                                                                      3) - Large Intestine, colonic mucosa                                                      • Final Diagnosis 12/04/2018    Final                    Value:This result contains rich text formatting which cannot be displayed here.   • Gross Description 12/04/2018    Final                    Value:This result contains rich text formatting which cannot be displayed here.     Assessment/Plan      1. Chronic diarrhea    2. Irritable bowel syndrome with diarrhea    3. Gastroesophageal reflux disease without esophagitis    4. Chronic gastritis without bleeding, unspecified gastritis type    .       Orders placed during this encounter include:  No orders of the defined types were placed in this encounter.      * Surgery not found *    Review and/or summary of lab tests, radiology, procedures, medications. Review and summary of old records and obtaining of history. The risks and benefits of my recommendations, as well as other treatment options were discussed with the patient today. Questions were answered.    New Medications Ordered This Visit   Medications   • dicyclomine (BENTYL) 20 MG tablet     Sig: Take 1 tablet by mouth 4 (Four) Times a Day Before Meals & at Bedtime As Needed (cramping) for up to 30 days.     Dispense:  90 tablet     Refill:  5       Follow-up: Return in about 3 months (around  3/11/2019).          This document has been electronically signed by AUTUMN Bang on December 11, 2018 12:39 PM             Results for orders placed or performed during the hospital encounter of 12/04/18   Tissue Pathology Exam   Result Value Ref Range    Case Report       Surgical Pathology Report                         Case: [id number]                                  Authorizing Provider:  Jaiyeola, Aderemi, MD      Collected:           12/04/2018 03:49 PM          Ordering Location:     [facility]             Received:            12/05/2018 07:51 AM                                 [facility]                                                     Pathologist:           Rian Brown MD                                                         Specimens:   1) - Small Intestine, Duodenum                                                                      2) - Gastric, Antrum, and body                                                                      3) - Large Intestine, colonic mucosa                                                       Final Diagnosis       1.  MUCOSA, DUODENUM:  NO SIGNIFICANT HISTOLOGIC ABNORMALITY.    2.  MUCOSA, ANTRUM AND BODY OF STOMACH:  REACTIVE GASTROPATHY.    3.  MUCOSA, COLON:  NO SIGNIFICANT HISTOLOGIC ABNORMALITY.      Gross Description       Received for examination are 3 containers, each of which have nodular bits of white soft tissue measuring 0.3-0.5 cc in aggregate.  All specimens are embedded as labeled.  1A duodenum; 2A antrum and body of stomach; 3A mucosa of colon.     KOH Prep - Brushing, Esophagus   Result Value Ref Range    KOH Prep No yeast or hyphal elements seen No yeast or hyphal elements seen

## 2018-12-11 NOTE — PATIENT INSTRUCTIONS
[place] Diet  A bland diet consists of foods that do not have a lot of fat or fiber. Foods without fat or fiber are easier for the body to digest. They are also less likely to irritate your mouth, throat, stomach, and other parts of your gastrointestinal tract. A bland diet is sometimes called a BRAT diet.  What is my plan?  Your health care provider or dietitian may recommend specific changes to your diet to prevent and treat your symptoms, such as:  · Eating small meals often.  · Cooking food until it is soft enough to chew easily.  · Chewing your food well.  · Drinking fluids slowly.  · Not eating foods that are very spicy, sour, or fatty.  · Not eating citrus fruits, such as oranges and grapefruit.    What do I need to know about this diet?  · Eat a variety of foods from the bland diet food list.  · Do not follow a bland diet longer than you have to.  · Ask your health care provider whether you should take vitamins.  What foods can I eat?  Grains    Hot cereals, such as cream of wheat. Bread, crackers, or tortillas made from refined white flour. Rice.  Vegetables  Canned or cooked vegetables. Mashed or boiled potatoes.  Fruits  Bananas. Applesauce. Other types of cooked or canned fruit with the skin and seeds removed, such as canned peaches or pears.  Meats and Other Protein Sources  Scrambled eggs. Creamy peanut butter or other nut butters. Lean, well-cooked meats, such as chicken or fish. Tofu. Soups or broths.  Dairy  Low-fat dairy products, such as milk, cottage cheese, or yogurt.  Beverages  Water. Herbal tea. Apple juice.  Sweets and Desserts  Pudding. Custard. Fruit gelatin. Ice cream.  Fats and Oils  Mild salad dressings. Canola or olive oil.  The items listed above may not be a complete list of allowed foods or beverages. Contact your dietitian for more options.  What foods are not recommended?  Foods and ingredients that are often not recommended include:  · Spicy foods, such as hot sauce or  salsa.  · Fried foods.  · Sour foods, such as pickled or fermented foods.  · Raw vegetables or fruits, especially citrus or berries.  · Caffeinated drinks.  · Alcohol.  · Strongly flavored seasonings or condiments.    The items listed above may not be a complete list of foods and beverages that are not allowed. Contact your dietitian for more information.  This information is not intended to replace advice given to you by your health care provider. Make sure you discuss any questions you have with your health care provider.  Document Released: 04/10/2017 Document Revised: 05/25/2017 Document Reviewed: 12/30/2015  [employer] Patient Education © 2018 [employer]

## 2019-07-17 ENCOUNTER — APPOINTMENT (OUTPATIENT)
Dept: ULTRASOUND IMAGING | Facility: HOSPITAL | Age: 49
End: 2019-07-17

## 2019-08-02 ENCOUNTER — APPOINTMENT (OUTPATIENT)
Dept: CT IMAGING | Facility: HOSPITAL | Age: 49
End: 2019-08-02

## 2019-08-02 ENCOUNTER — HOSPITAL ENCOUNTER (EMERGENCY)
Facility: HOSPITAL | Age: 49
Discharge: HOME | End: 2019-08-02
Attending: EMERGENCY MEDICINE | Admitting: EMERGENCY MEDICINE

## 2019-08-02 VITALS
HEART RATE: 55 BPM | OXYGEN SATURATION: 95 % | BODY MASS INDEX: 44.41 KG/M2 | TEMPERATURE: 98.3 F | RESPIRATION RATE: 20 BRPM | SYSTOLIC BLOOD PRESSURE: 162 MMHG | WEIGHT: 293 LBS | HEIGHT: 68 IN | DIASTOLIC BLOOD PRESSURE: 77 MMHG

## 2019-08-02 DIAGNOSIS — R51.9 NONINTRACTABLE HEADACHE, UNSPECIFIED CHRONICITY PATTERN, UNSPECIFIED HEADACHE TYPE: ICD-10-CM
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
## 2019-08-02 DIAGNOSIS — S16.1XXA STRAIN OF NECK MUSCLE, INITIAL ENCOUNTER: Primary | ICD-10-CM

## 2019-08-02 PROCEDURE — 72125 CT NECK SPINE W/O DYE: CPT

## 2019-08-02 PROCEDURE — 70450 CT HEAD/BRAIN W/O DYE: CPT

## 2019-08-02 PROCEDURE — 99284 EMERGENCY DEPT VISIT MOD MDM: CPT

## 2019-08-02 PROCEDURE — 25010000002 ORPHENADRINE CITRATE PER 60 MG: Performed by: PHYSICIAN ASSISTANT

## 2019-08-02 PROCEDURE — 96372 THER/PROPH/DIAG INJ SC/IM: CPT

## 2019-08-02 PROCEDURE — 99283 EMERGENCY DEPT VISIT LOW MDM: CPT

## 2019-08-02 PROCEDURE — 25010000002 KETOROLAC TROMETHAMINE PER 15 MG: Performed by: PHYSICIAN ASSISTANT

## 2019-08-02 RX ORDER — HYDROCODONE BITARTRATE AND ACETAMINOPHEN 5; 325 MG/1; MG/1
1 TABLET ORAL ONCE
Status: COMPLETED | OUTPATIENT
Start: 2019-08-02 | End: 2019-08-02

## 2019-08-02 RX ORDER — KETOROLAC TROMETHAMINE 30 MG/ML
30 INJECTION, SOLUTION INTRAMUSCULAR; INTRAVENOUS ONCE
Status: COMPLETED | OUTPATIENT
Start: 2019-08-02 | End: 2019-08-02

## 2019-08-02 RX ORDER — CYCLOBENZAPRINE HCL 10 MG
10 TABLET ORAL 2 TIMES DAILY PRN
Qty: 14 TABLET | Refills: 0 | Status: SHIPPED | OUTPATIENT
Start: 2019-08-02 | End: 2019-08-09

## 2019-08-02 RX ORDER — ORPHENADRINE CITRATE 30 MG/ML
60 INJECTION INTRAMUSCULAR; INTRAVENOUS ONCE
Status: COMPLETED | OUTPATIENT
Start: 2019-08-02 | End: 2019-08-02

## 2019-08-02 RX ORDER — MELOXICAM 15 MG/1
15 TABLET ORAL DAILY
Qty: 7 TABLET | Refills: 0 | Status: SHIPPED | OUTPATIENT
Start: 2019-08-02 | End: 2019-08-09

## 2019-08-02 RX ADMIN — ORPHENADRINE CITRATE 60 MG: 30 INJECTION INTRAMUSCULAR; INTRAVENOUS at 11:53

## 2019-08-02 RX ADMIN — HYDROCODONE BITARTRATE AND ACETAMINOPHEN 1 TABLET: 5; 325 TABLET ORAL at 13:11

## 2019-08-02 RX ADMIN — KETOROLAC TROMETHAMINE 30 MG: 30 INJECTION, SOLUTION INTRAMUSCULAR at 11:53

## 2019-08-02 NOTE — ED NOTES
Pt stated that she was sent here from her PCP to be seen. Pt stated that she is unable to move her neck and is complaining of a severe headache.      Roberto Larson  08/02/19 1041
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
## 2019-08-08 ENCOUNTER — HOSPITAL ENCOUNTER (EMERGENCY)
Facility: HOSPITAL | Age: 49
Discharge: HOME | End: 2019-08-08
Attending: FAMILY MEDICINE | Admitting: FAMILY MEDICINE

## 2019-08-08 ENCOUNTER — APPOINTMENT (OUTPATIENT)
Dept: CT IMAGING | Facility: HOSPITAL | Age: 49
End: 2019-08-08

## 2019-08-08 VITALS
BODY MASS INDEX: 44.41 KG/M2 | WEIGHT: 293 LBS | RESPIRATION RATE: 20 BRPM | SYSTOLIC BLOOD PRESSURE: 175 MMHG | OXYGEN SATURATION: 96 % | HEART RATE: 65 BPM | TEMPERATURE: 98.7 F | DIASTOLIC BLOOD PRESSURE: 88 MMHG | HEIGHT: 68 IN

## 2019-08-08 DIAGNOSIS — R10.12 LUQ ABDOMINAL PAIN: ICD-10-CM

## 2019-08-08 DIAGNOSIS — R11.2 NON-INTRACTABLE VOMITING WITH NAUSEA, UNSPECIFIED VOMITING TYPE: Primary | ICD-10-CM

## 2019-08-08 LAB
ALBUMIN SERPL-MCNC: 3.8 G/DL (ref 3.5–5.2)
ALBUMIN/GLOB SERPL: 1.2 G/DL
ALP SERPL-CCNC: 66 U/L (ref 39–117)
ALT SERPL W P-5'-P-CCNC: 21 U/L (ref 1–33)
ANION GAP SERPL CALCULATED.3IONS-SCNC: 10 MMOL/L (ref 5–15)
AST SERPL-CCNC: 20 U/L (ref 1–32)
BASOPHILS # BLD AUTO: 0.04 10*3/MM3 (ref 0–0.2)
BASOPHILS NFR BLD AUTO: 0.5 % (ref 0–1.5)
BILIRUB SERPL-MCNC: 0.3 MG/DL (ref 0.2–1.2)
BILIRUB UR QL STRIP: NEGATIVE
BUN BLD-MCNC: 14 MG/DL (ref 6–20)
BUN/CREAT SERPL: 19.7 (ref 7–25)
CALCIUM SPEC-SCNC: 9 MG/DL (ref 8.6–10.5)
CHLORIDE SERPL-SCNC: 103 MMOL/L (ref 98–107)
CLARITY UR: CLEAR
CO2 SERPL-SCNC: 27 MMOL/L (ref 22–29)
COLOR UR: YELLOW
CREAT BLD-MCNC: 0.71 MG/DL (ref 0.57–1)
DEPRECATED RDW RBC AUTO: 44 FL (ref 37–54)
EOSINOPHIL # BLD AUTO: 0.13 10*3/MM3 (ref 0–0.4)
EOSINOPHIL NFR BLD AUTO: 1.5 % (ref 0.3–6.2)
ERYTHROCYTE [DISTWIDTH] IN BLOOD BY AUTOMATED COUNT: 14.1 % (ref 12.3–15.4)
GFR SERPL CREATININE-BSD FRML MDRD: 88 ML/MIN/1.73
GLOBULIN UR ELPH-MCNC: 3.2 GM/DL
GLUCOSE BLD-MCNC: 99 MG/DL (ref 65–99)
GLUCOSE UR STRIP-MCNC: NEGATIVE MG/DL
HCT VFR BLD AUTO: 43.8 % (ref 34–46.6)
HGB BLD-MCNC: 14 G/DL (ref 12–15.9)
HGB UR QL STRIP.AUTO: NEGATIVE
HOLD SPECIMEN: NORMAL
HOLD SPECIMEN: NORMAL
IMM GRANULOCYTES # BLD AUTO: 0.03 10*3/MM3 (ref 0–0.05)
IMM GRANULOCYTES NFR BLD AUTO: 0.3 % (ref 0–0.5)
KETONES UR QL STRIP: NEGATIVE
LEUKOCYTE ESTERASE UR QL STRIP.AUTO: NEGATIVE
LIPASE SERPL-CCNC: 33 U/L (ref 13–60)
LYMPHOCYTES # BLD AUTO: 2.2 10*3/MM3 (ref 0.7–3.1)
LYMPHOCYTES NFR BLD AUTO: 25.1 % (ref 19.6–45.3)
MCH RBC QN AUTO: 27.5 PG (ref 26.6–33)
MCHC RBC AUTO-ENTMCNC: 32 G/DL (ref 31.5–35.7)
MCV RBC AUTO: 86.1 FL (ref 79–97)
MONOCYTES # BLD AUTO: 0.57 10*3/MM3 (ref 0.1–0.9)
MONOCYTES NFR BLD AUTO: 6.5 % (ref 5–12)
NEUTROPHILS # BLD AUTO: 5.8 10*3/MM3 (ref 1.7–7)
NEUTROPHILS NFR BLD AUTO: 66.1 % (ref 42.7–76)
NITRITE UR QL STRIP: NEGATIVE
NRBC BLD AUTO-RTO: 0 /100 WBC (ref 0–0.2)
PH UR STRIP.AUTO: 6.5 [PH] (ref 5–9)
PLATELET # BLD AUTO: 343 10*3/MM3 (ref 140–450)
PMV BLD AUTO: 10.4 FL (ref 6–12)
POTASSIUM BLD-SCNC: 4.2 MMOL/L (ref 3.5–5.2)
PROT SERPL-MCNC: 7 G/DL (ref 6–8.5)
PROT UR QL STRIP: NEGATIVE
RBC # BLD AUTO: 5.09 10*6/MM3 (ref 3.77–5.28)
SODIUM BLD-SCNC: 140 MMOL/L (ref 136–145)
SP GR UR STRIP: 1.02 (ref 1–1.03)
UROBILINOGEN UR QL STRIP: NORMAL
WBC NRBC COR # BLD: 8.77 10*3/MM3 (ref 3.4–10.8)
WHOLE BLOOD HOLD SPECIMEN: NORMAL
WHOLE BLOOD HOLD SPECIMEN: NORMAL

## 2019-08-08 PROCEDURE — 81003 URINALYSIS AUTO W/O SCOPE: CPT | Performed by: FAMILY MEDICINE

## 2019-08-08 PROCEDURE — 85025 COMPLETE CBC W/AUTO DIFF WBC: CPT

## 2019-08-08 PROCEDURE — 0 DIATRIZOATE MEGLUMINE & SODIUM PER 1 ML: Performed by: FAMILY MEDICINE

## 2019-08-08 PROCEDURE — 96374 THER/PROPH/DIAG INJ IV PUSH: CPT

## 2019-08-08 PROCEDURE — 99284 EMERGENCY DEPT VISIT MOD MDM: CPT

## 2019-08-08 PROCEDURE — 74176 CT ABD & PELVIS W/O CONTRAST: CPT

## 2019-08-08 PROCEDURE — 83690 ASSAY OF LIPASE: CPT | Performed by: FAMILY MEDICINE

## 2019-08-08 PROCEDURE — 25010000002 ONDANSETRON PER 1 MG: Performed by: PHYSICIAN ASSISTANT

## 2019-08-08 PROCEDURE — 80053 COMPREHEN METABOLIC PANEL: CPT | Performed by: FAMILY MEDICINE

## 2019-08-08 RX ORDER — ONDANSETRON 2 MG/ML
4 INJECTION INTRAMUSCULAR; INTRAVENOUS ONCE
Status: COMPLETED | OUTPATIENT
Start: 2019-08-08 | End: 2019-08-08

## 2019-08-08 RX ORDER — ONDANSETRON 4 MG/1
4 TABLET, ORALLY DISINTEGRATING ORAL EVERY 6 HOURS PRN
Qty: 20 TABLET | Refills: 0 | Status: SHIPPED | OUTPATIENT
Start: 2019-08-08

## 2019-08-08 RX ORDER — SODIUM CHLORIDE 0.9 % (FLUSH) 0.9 %
10 SYRINGE (ML) INJECTION AS NEEDED
Status: DISCONTINUED | OUTPATIENT
Start: 2019-08-08 | End: 2019-08-08 | Stop reason: HOSPADM

## 2019-08-08 RX ADMIN — ONDANSETRON 4 MG: 2 INJECTION INTRAMUSCULAR; INTRAVENOUS at 13:22

## 2019-08-08 RX ADMIN — DIATRIZOATE MEGLUMINE AND DIATRIZOATE SODIUM 30 ML: 660; 100 LIQUID ORAL; RECTAL at 15:21
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
## 2019-08-08 NOTE — ED NOTES
Patient here for nausea and vomiting that started today.  Diarrhea she has a lot.  Patient had a colonoscopy within the last year and has hiatal hernia.       Sharda Acevedo, RN  08/08/19 [id number]

## 2019-08-15 ENCOUNTER — HOSPITAL ENCOUNTER (OUTPATIENT)
Dept: ULTRASOUND IMAGING | Facility: HOSPITAL | Age: 49
Discharge: HOME | End: 2019-08-15
Admitting: NURSE PRACTITIONER

## 2019-08-15 DIAGNOSIS — R11.2 NAUSEA AND VOMITING, INTRACTABILITY OF VOMITING NOT SPECIFIED, UNSPECIFIED VOMITING TYPE: ICD-10-CM

## 2019-08-15 PROCEDURE — 76705 ECHO EXAM OF ABDOMEN: CPT

## 2019-08-19 ENCOUNTER — APPOINTMENT (OUTPATIENT)
Dept: ULTRASOUND IMAGING | Facility: HOSPITAL | Age: 49
End: 2019-08-19

## 2019-08-20 ENCOUNTER — OFFICE VISIT (OUTPATIENT)
Dept: GASTROENTEROLOGY | Facility: CLINIC | Age: 49
End: 2019-08-20

## 2019-08-20 VITALS
SYSTOLIC BLOOD PRESSURE: 134 MMHG | DIASTOLIC BLOOD PRESSURE: 84 MMHG | WEIGHT: 293 LBS | HEART RATE: 64 BPM | HEIGHT: 68 IN | BODY MASS INDEX: 44.41 KG/M2

## 2019-08-20 DIAGNOSIS — R15.2 INCONTINENCE OF FECES WITH FECAL URGENCY: ICD-10-CM

## 2019-08-20 DIAGNOSIS — R10.811 RIGHT UPPER QUADRANT ABDOMINAL TENDERNESS WITHOUT REBOUND TENDERNESS: ICD-10-CM

## 2019-08-20 DIAGNOSIS — R15.9 INCONTINENCE OF FECES WITH FECAL URGENCY: ICD-10-CM

## 2019-08-20 DIAGNOSIS — R19.7 DIARRHEA OF PRESUMED INFECTIOUS ORIGIN: ICD-10-CM

## 2019-08-20 DIAGNOSIS — R19.4 CHANGE IN BOWEL HABITS: Primary | ICD-10-CM

## 2019-08-20 PROCEDURE — 99214 OFFICE O/P EST MOD 30 MIN: CPT | Performed by: NURSE PRACTITIONER

## 2019-08-20 RX ORDER — ACYCLOVIR 800 MG/1
TABLET ORAL
Refills: 0 | COMMUNITY
Start: 2019-06-05 | End: 2019-09-17

## 2019-08-20 RX ORDER — PAROXETINE HYDROCHLORIDE 20 MG/1
TABLET, FILM COATED ORAL
Refills: 0 | COMMUNITY
Start: 2019-07-09

## 2019-08-20 RX ORDER — IBUPROFEN 600 MG/1
TABLET ORAL
Refills: 0 | COMMUNITY
Start: 2019-08-06

## 2019-08-20 RX ORDER — FUROSEMIDE 20 MG/1
TABLET ORAL
Refills: 0 | COMMUNITY
Start: 2019-07-08

## 2019-08-20 RX ORDER — BUSPIRONE HYDROCHLORIDE 7.5 MG/1
7.5 TABLET ORAL 2 TIMES DAILY
Refills: 0 | COMMUNITY
Start: 2019-07-02
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
## 2019-08-20 NOTE — PATIENT INSTRUCTIONS
Diarrhea, Adult  Diarrhea is frequent loose and watery bowel movements. Diarrhea can make you feel weak and cause you to become dehydrated. Dehydration can make you tired and thirsty, cause you to have a dry mouth, and decrease how often you urinate. Diarrhea typically lasts 2-3 days. However, it can last longer if it is a sign of something more serious. It is important to treat your diarrhea as told by your health care provider.  Follow these instructions at home:  Eating and drinking  Follow these recommendations as told by your health care provider:  · Take an oral rehydration solution (ORS). This is a drink that is sold at pharmacies and retail stores.  · Drink clear fluids, such as water, ice chips, diluted fruit juice, and low-calorie sports drinks.  · Eat bland, easy-to-digest foods in small amounts as you are able. These foods include bananas, applesauce, rice, lean meats, toast, and crackers.  · Avoid drinking fluids that contain a lot of sugar or caffeine, such as energy drinks, sports drinks, and soda.  · Avoid alcohol.  · Avoid spicy or fatty foods.    General instructions  · Drink enough fluid to keep your urine clear or pale yellow.  · Wash your hands often. If soap and water are not available, use hand .  · Make sure that all people in your household wash their hands well and often.  · Take over-the-counter and prescription medicines only as told by your health care provider.  · Rest at home while you recover.  · Watch your condition for any changes.  · Take a warm bath to relieve any burning or pain from frequent diarrhea episodes.  · Keep all follow-up visits as told by your health care provider. This is important.  Contact a health care provider if:  · You have a fever.  · Your diarrhea gets worse.  · You have new symptoms.  · You cannot keep fluids down.  · You feel light-headed or dizzy.  · You have a headache  · You have muscle cramps.  Get help right away if:  · You have chest  pain.  · You feel extremely weak or you faint.  · You have bloody or black stools or stools that look like tar.  · You have severe pain, cramping, or bloating in your abdomen.  · You have trouble breathing or you are breathing very quickly.  · Your heart is beating very quickly.  · Your skin feels cold and clammy.  · You feel confused.  · You have signs of dehydration, such as:  ? Dark urine, very little urine, or no urine.  ? Cracked lips.  ? Dry mouth.  ? Sunken eyes.  ? Sleepiness.  ? Weakness.  This information is not intended to replace advice given to you by your health care provider. Make sure you discuss any questions you have with your health care provider.  Document Released: 12/08/2003 Document Revised: 08/01/2018 Document Reviewed: 08/23/2016  [employer] Interactive Patient Education © 2019 [employer]

## 2019-08-22 ENCOUNTER — APPOINTMENT (OUTPATIENT)
Dept: ULTRASOUND IMAGING | Facility: HOSPITAL | Age: 49
End: 2019-08-22

## 2019-08-27 ENCOUNTER — HOSPITAL ENCOUNTER (OUTPATIENT)
Dept: NUCLEAR MEDICINE | Facility: HOSPITAL | Age: 49
Discharge: HOME | End: 2019-08-27

## 2019-08-27 DIAGNOSIS — R19.7 DIARRHEA OF PRESUMED INFECTIOUS ORIGIN: ICD-10-CM

## 2019-08-27 DIAGNOSIS — R15.2 INCONTINENCE OF FECES WITH FECAL URGENCY: ICD-10-CM

## 2019-08-27 DIAGNOSIS — R19.4 CHANGE IN BOWEL HABITS: ICD-10-CM

## 2019-08-27 DIAGNOSIS — R15.9 INCONTINENCE OF FECES WITH FECAL URGENCY: ICD-10-CM

## 2019-08-27 DIAGNOSIS — R10.811 RIGHT UPPER QUADRANT ABDOMINAL TENDERNESS WITHOUT REBOUND TENDERNESS: ICD-10-CM

## 2019-08-27 PROCEDURE — 0 TECHNETIUM TC 99M MEBROFENIN KIT: Performed by: NURSE PRACTITIONER

## 2019-08-27 PROCEDURE — A9537 TC99M MEBROFENIN: HCPCS | Performed by: NURSE PRACTITIONER

## 2019-08-27 PROCEDURE — 78226 HEPATOBILIARY SYSTEM IMAGING: CPT

## 2019-08-27 RX ORDER — KIT FOR THE PREPARATION OF TECHNETIUM TC 99M MEBROFENIN 45 MG/10ML
1 INJECTION, POWDER, LYOPHILIZED, FOR SOLUTION INTRAVENOUS
Status: COMPLETED | OUTPATIENT
Start: 2019-08-27 | End: 2019-08-27

## 2019-08-27 RX ADMIN — MEBROFENIN 1 DOSE: 45 INJECTION, POWDER, LYOPHILIZED, FOR SOLUTION INTRAVENOUS at 08:55

## 2019-08-29 ENCOUNTER — TELEPHONE (OUTPATIENT)
Dept: GASTROENTEROLOGY | Facility: CLINIC | Age: 49
End: 2019-08-29

## 2019-08-29 NOTE — TELEPHONE ENCOUNTER
Patient called regarding normal hida scan,states she is still having problems with diarrhea. Patient has not turned in stool panel Jacquie Mcdonald ordered yet. Patient was told to turn in stool test as soon as she can.Patient voiced understanding .

## 2019-08-29 NOTE — TELEPHONE ENCOUNTER
Voicemail message was left for patient with normal results of hida scan.        ----- Message from AUTUMN Soler sent at 8/28/2019  7:41 AM CDT -----  Please call patient with results

## 2019-08-30 ENCOUNTER — TRANSCRIBE ORDERS (OUTPATIENT)
Dept: MRI IMAGING | Facility: HOSPITAL | Age: 49
End: 2019-08-30

## 2019-08-30 DIAGNOSIS — M54.2 NECK PAIN: Primary | ICD-10-CM

## 2019-08-30 DIAGNOSIS — R20.0 NUMBNESS OF ARM: ICD-10-CM

## 2019-08-30 DIAGNOSIS — R51.9 NONINTRACTABLE HEADACHE, UNSPECIFIED CHRONICITY PATTERN, UNSPECIFIED HEADACHE TYPE: ICD-10-CM

## 2019-09-04 ENCOUNTER — HOSPITAL ENCOUNTER (OUTPATIENT)
Dept: MRI IMAGING | Facility: HOSPITAL | Age: 49
Discharge: HOME | End: 2019-09-04

## 2019-09-04 DIAGNOSIS — R20.0 NUMBNESS OF ARM: ICD-10-CM

## 2019-09-04 DIAGNOSIS — R51.9 NONINTRACTABLE HEADACHE, UNSPECIFIED CHRONICITY PATTERN, UNSPECIFIED HEADACHE TYPE: ICD-10-CM

## 2019-09-04 DIAGNOSIS — M54.2 NECK PAIN: ICD-10-CM

## 2019-09-09 ENCOUNTER — HOSPITAL ENCOUNTER (OUTPATIENT)
Dept: MRI IMAGING | Facility: HOSPITAL | Age: 49
Discharge: HOME | End: 2019-09-09

## 2019-09-09 VITALS
HEART RATE: 59 BPM | RESPIRATION RATE: 18 BRPM | OXYGEN SATURATION: 98 % | DIASTOLIC BLOOD PRESSURE: 74 MMHG | SYSTOLIC BLOOD PRESSURE: 142 MMHG

## 2019-09-09 RX ORDER — DIAZEPAM 2 MG/1
TABLET ORAL
Status: COMPLETED | OUTPATIENT
Start: 2019-09-09 | End: 2019-09-09

## 2019-09-09 RX ADMIN — DIAZEPAM 10 MG: 2 TABLET ORAL at 10:01

## 2019-09-17 ENCOUNTER — OFFICE VISIT (OUTPATIENT)
Dept: GASTROENTEROLOGY | Facility: CLINIC | Age: 49
End: 2019-09-17

## 2019-09-17 VITALS
HEIGHT: 68 IN | SYSTOLIC BLOOD PRESSURE: 140 MMHG | HEART RATE: 60 BPM | DIASTOLIC BLOOD PRESSURE: 80 MMHG | OXYGEN SATURATION: 99 % | BODY MASS INDEX: 44.41 KG/M2 | WEIGHT: 293 LBS

## 2019-09-17 DIAGNOSIS — K21.00 GASTROESOPHAGEAL REFLUX DISEASE WITH ESOPHAGITIS: ICD-10-CM

## 2019-09-17 DIAGNOSIS — R11.14 BILIOUS VOMITING WITH NAUSEA: ICD-10-CM

## 2019-09-17 DIAGNOSIS — K52.9 CHRONIC DIARRHEA: Primary | ICD-10-CM

## 2019-09-17 PROCEDURE — 99214 OFFICE O/P EST MOD 30 MIN: CPT | Performed by: NURSE PRACTITIONER

## 2019-09-17 RX ORDER — PANTOPRAZOLE SODIUM 40 MG/1
40 TABLET, DELAYED RELEASE ORAL DAILY
Qty: 30 TABLET | Refills: 5 | Status: SHIPPED | OUTPATIENT
Start: 2019-09-17

## 2019-09-17 RX ORDER — DEXLANSOPRAZOLE 60 MG/1
60 CAPSULE, DELAYED RELEASE ORAL DAILY
Qty: 30 CAPSULE | Refills: 5 | Status: SHIPPED | OUTPATIENT
Start: 2019-09-17 | End: 2019-09-17

## 2019-09-17 NOTE — PROGRESS NOTES
Chief Complaint   Patient presents with   • Diarrhea   • Nausea   • Vomiting       Subjective    Caprice Payal Sinclair is a 48 y.o. female. she is here today for follow-up.    History of Present Illness  48-year-old female presents to discuss nausea vomiting diarrhea and excessive reflux symptoms.  States diarrhea has been chronic for the last several years and her main concern was episodes of incontinence though states now she has been eating cheese and diarrhea has lessened.  She was first evaluated Dr. Marshall and underwent EGD and colonoscopy December 4, 2018 which were essentially unremarkable EGD new notes of fluid in her stomach and gastritis scraping for yeast was negative.  Ultrasound of gallbladder has been negative she underwent HIDA scan August 27 which noted normal ejection fraction 60% we tried patient on Prilosec which has not improved symptoms reports frequently has reflux symptoms and has to to over-the-counter antacids routinely.  States she always has nausea and vomiting unable to pinpoint any certain trigger.  Plan; we will schedule patient for gastric empty due to persistent nausea vomiting change omeprazole to Protonix if no improvement of that we will consider Dexilant Imodium as needed for chronic diarrhea.  Follow-up after test return office sooner if needed       The following portions of the patient's history were reviewed and updated as appropriate:   Past Medical History:   Diagnosis Date   • Diarrhea    • Nausea      Past Surgical History:   Procedure Laterality Date   • BACK SURGERY  1992   • COLONOSCOPY N/A 12/4/2018    Procedure: COLONOSCOPY;  Surgeon: Jaiyeola, Aderemi, MD;  Location: [facility] ENDOSCOPY;  Service: Gastroenterology   • ENDOSCOPY N/A 12/4/2018    Procedure: ESOPHAGOGASTRODUODENOSCOPY;  Surgeon: Jaiyeola, Aderemi, MD;  Location: [facility] ENDOSCOPY;  Service: Gastroenterology   • HYSTERECTOMY  1991   • TONSILECTOMY, ADENOIDECTOMY, BILATERAL MYRINGOTOMY AND TUBES  1992     Family  History   Problem Relation Age of Onset   • No Known Problems Mother    • Alcohol abuse Father    • No Known Problems Sister    • No Known Problems Brother    • No Known Problems Maternal Aunt    • No Known Problems Maternal Uncle    • No Known Problems Paternal Aunt    • No Known Problems Paternal Uncle    • No Known Problems Maternal Grandmother    • No Known Problems Maternal Grandfather    • No Known Problems Paternal Grandmother    • No Known Problems Paternal Grandfather      OB History     No data available        Prior to Admission medications    Medication Sig Start Date End Date Taking? Authorizing Provider   busPIRone (BUSPAR) 7.5 MG tablet Take 7.5 mg by mouth 2 (Two) Times a Day. 7/2/19  Yes Zoraida Mckeon MD   fluticasone (FLONASE) 50 MCG/ACT nasal spray  12/10/18  Yes Zoraida Mckeon MD   furosemide (LASIX) 20 MG tablet TAKE 1 TABLET BY MOUTH ONCE DAILY FOR 14 DAYS 7/8/19  Yes Zoraida Mckeon MD   ibuprofen (ADVIL,MOTRIN) 600 MG tablet TAKE 1 TABLET BY MOUTH TWICE DAILY FOR 7 DAYS AS NEEDED WITH FOOD OR MILK 8/6/19  Yes Zoraida Mckeon MD   omeprazole (priLOSEC) 40 MG capsule Take 1 capsule by mouth Daily. 11/2/18  Yes Jacquie Corrales APRN   ondansetron ODT (ZOFRAN-ODT) 4 MG disintegrating tablet Take 1 tablet by mouth Every 6 (Six) Hours As Needed for Nausea. 8/8/19  Yes Francis Pearce PA-C   PARoxetine (PAXIL) 20 MG tablet TAKE 1 TABLET BY MOUTH ONCE DAILY IN THE MORNING FOR 30 DAYS 7/9/19  Yes Zoraida Mckeon MD   QVAR REDIHALER 80 MCG/ACT inhaler Inhale 2 puffs 2 (Two) Times a Day. 10/23/18  Yes Zoraida Mckeon MD   VENTOLIN  (90 Base) MCG/ACT inhaler Inhale 2 puffs Every 4 (Four) Hours As Needed. 10/23/18  Yes Zoraida Mckeon MD   acyclovir (ZOVIRAX) 800 MG tablet TAKE 1 TABLET BY MOUTH 4 TIMES DAILY FOR 7 DAYS 6/5/19   Zoraida Mckeon MD   hydrOXYzine (ATARAX) 10 MG tablet  12/10/18   Zoraida Mckeon MD   predniSONE (DELTASONE)  "20 MG tablet  12/10/18   Provider, Zoraida, MD     Allergies   Allergen Reactions   • Contrast Dye Anaphylaxis     Cardiac arrest      Social History     Socioeconomic History   • Marital status: Single     Spouse name: Not on file   • Number of children: Not on file   • Years of education: Not on file   • Highest education level: Not on file   Tobacco Use   • Smoking status: Current Every Day Smoker     Packs/day: 0.50     Start date: 11/2/1988   • Smokeless tobacco: Never Used   Substance and Sexual Activity   • Alcohol use: Yes     Comment: social   • Drug use: No   • Sexual activity: Defer       Review of Systems  Review of Systems   Constitutional: Positive for fatigue. Negative for activity change, appetite change, chills, diaphoresis, fever and unexpected weight change.   HENT: Negative for sore throat and trouble swallowing.    Respiratory: Negative for shortness of breath.    Gastrointestinal: Positive for abdominal pain, diarrhea (chronic has slowed some better recently ), nausea and vomiting. Negative for abdominal distention, anal bleeding, blood in stool, constipation and rectal pain.   Musculoskeletal: Negative for arthralgias.   Skin: Negative for pallor.   Neurological: Negative for light-headedness.        /80 (BP Location: Left arm, Patient Position: Sitting)   Pulse 60   Ht 172.7 cm (68\")   Wt 135 kg (296 lb 9.6 oz)   SpO2 99%   BMI 45.10 kg/m²     Objective    Physical Exam   Constitutional: She is oriented to person, place, and time. She appears well-developed and well-nourished. She is cooperative. No distress.   HENT:   Head: Normocephalic and atraumatic.   Neck: Normal range of motion. Neck supple. No thyromegaly present.   Cardiovascular: Normal rate, regular rhythm and normal heart sounds.   Pulmonary/Chest: Effort normal and breath sounds normal. She has no wheezes. She has no rhonchi. She has no rales.   Abdominal: Soft. Normal appearance and bowel sounds are normal. She " exhibits no distension and no fluid wave. There is no hepatosplenomegaly. There is tenderness in the epigastric area and left upper quadrant. There is no rigidity and no guarding. No hernia.   Lymphadenopathy:     She has no cervical adenopathy.   Neurological: She is alert and oriented to person, place, and time.   Skin: Skin is warm, dry and intact. No rash noted. No pallor.   Psychiatric: She has a normal mood and affect. Her speech is normal.     Admission on 08/08/2019, Discharged on 08/08/2019   Component Date Value Ref Range Status   • Glucose 08/08/2019 99  65 - 99 mg/dL Final   • BUN 08/08/2019 14  6 - 20 mg/dL Final   • Creatinine 08/08/2019 0.71  0.57 - 1.00 mg/dL Final   • Sodium 08/08/2019 140  136 - 145 mmol/L Final   • Potassium 08/08/2019 4.2  3.5 - 5.2 mmol/L Final   • Chloride 08/08/2019 103  98 - 107 mmol/L Final   • CO2 08/08/2019 27.0  22.0 - 29.0 mmol/L Final   • Calcium 08/08/2019 9.0  8.6 - 10.5 mg/dL Final   • Total Protein 08/08/2019 7.0  6.0 - 8.5 g/dL Final   • Albumin 08/08/2019 3.80  3.50 - 5.20 g/dL Final   • ALT (SGPT) 08/08/2019 21  1 - 33 U/L Final   • AST (SGOT) 08/08/2019 20  1 - 32 U/L Final   • Alkaline Phosphatase 08/08/2019 66  39 - 117 U/L Final   • Total Bilirubin 08/08/2019 0.3  0.2 - 1.2 mg/dL Final   • eGFR Non  Amer 08/08/2019 88  >60 mL/min/1.73 Final   • Globulin 08/08/2019 3.2  gm/dL Final   • A/G Ratio 08/08/2019 1.2  g/dL Final   • BUN/Creatinine Ratio 08/08/2019 19.7  7.0 - 25.0 Final   • Anion Gap 08/08/2019 10.0  5.0 - 15.0 mmol/L Final   • Lipase 08/08/2019 33  13 - 60 U/L Final   • Color, UA 08/08/2019 Yellow  Yellow, Straw, Dark Yellow, Radha Final   • Appearance, UA 08/08/2019 Clear  Clear Final   • pH, UA 08/08/2019 6.5  5.0 - 9.0 Final   • Specific Gravity, UA 08/08/2019 1.020  1.003 - 1.030 Final   • Glucose, UA 08/08/2019 Negative  Negative Final   • Ketones, UA 08/08/2019 Negative  Negative Final   • Bilirubin, UA 08/08/2019 Negative  Negative  Final   • Blood, UA 08/08/2019 Negative  Negative Final   • Protein, UA 08/08/2019 Negative  Negative Final   • Leuk Esterase, UA 08/08/2019 Negative  Negative Final   • Nitrite, UA 08/08/2019 Negative  Negative Final   • Urobilinogen, UA 08/08/2019 0.2 E.U./dL  0.2 - 1.0 E.U./dL Final   • Extra Tube 08/08/2019 hold for add-on   Final    Auto resulted   • Extra Tube 08/08/2019 Hold for add-ons.   Final    Auto resulted.   • Extra Tube 08/08/2019 hold for add-on   Final    Auto resulted   • Extra Tube 08/08/2019 Hold for add-ons.   Final    Auto resulted.   • WBC 08/08/2019 8.77  3.40 - 10.80 10*3/mm3 Final   • RBC 08/08/2019 5.09  3.77 - 5.28 10*6/mm3 Final   • Hemoglobin 08/08/2019 14.0  12.0 - 15.9 g/dL Final   • Hematocrit 08/08/2019 43.8  34.0 - 46.6 % Final   • MCV 08/08/2019 86.1  79.0 - 97.0 fL Final   • MCH 08/08/2019 27.5  26.6 - 33.0 pg Final   • MCHC 08/08/2019 32.0  31.5 - 35.7 g/dL Final   • RDW 08/08/2019 14.1  12.3 - 15.4 % Final   • RDW-SD 08/08/2019 44.0  37.0 - 54.0 fl Final   • MPV 08/08/2019 10.4  6.0 - 12.0 fL Final   • Platelets 08/08/2019 343  140 - 450 10*3/mm3 Final   • Neutrophil % 08/08/2019 66.1  42.7 - 76.0 % Final   • Lymphocyte % 08/08/2019 25.1  19.6 - 45.3 % Final   • Monocyte % 08/08/2019 6.5  5.0 - 12.0 % Final   • Eosinophil % 08/08/2019 1.5  0.3 - 6.2 % Final   • Basophil % 08/08/2019 0.5  0.0 - 1.5 % Final   • Immature Grans % 08/08/2019 0.3  0.0 - 0.5 % Final   • Neutrophils, Absolute 08/08/2019 5.80  1.70 - 7.00 10*3/mm3 Final   • Lymphocytes, Absolute 08/08/2019 2.20  0.70 - 3.10 10*3/mm3 Final   • Monocytes, Absolute 08/08/2019 0.57  0.10 - 0.90 10*3/mm3 Final   • Eosinophils, Absolute 08/08/2019 0.13  0.00 - 0.40 10*3/mm3 Final   • Basophils, Absolute 08/08/2019 0.04  0.00 - 0.20 10*3/mm3 Final   • Immature Grans, Absolute 08/08/2019 0.03  0.00 - 0.05 10*3/mm3 Final   • nRBC 08/08/2019 0.0  0.0 - 0.2 /100 WBC Final     Assessment/Plan      1. Chronic diarrhea    2.  Gastroesophageal reflux disease with esophagitis    3. Bilious vomiting with nausea    .       Orders placed during this encounter include:  Orders Placed This Encounter   Procedures   • NM Gastric Emptying     Standing Status:   Future     Standing Expiration Date:   9/17/2020     Order Specific Question:   Reason for Exam:     Answer:   nausea     Order Specific Question:   Patient Pregnant     Answer:   No     Order Specific Question:   Is the patient breastfeeding?     Answer:   No       * Surgery not found *    Review and/or summary of lab tests, radiology, procedures, medications. Review and summary of old records and obtaining of history. The risks and benefits of my recommendations, as well as other treatment options were discussed with the patient today. Questions were answered.    New Medications Ordered This Visit   Medications   • pantoprazole (PROTONIX) 40 MG EC tablet     Sig: Take 1 tablet by mouth Daily.     Dispense:  30 tablet     Refill:  5       Follow-up: Return in about 4 weeks (around 10/15/2019).          This document has been electronically signed by AUTUMN Bang on September 17, 2019 3:07 PM             Results for orders placed or performed during the hospital encounter of 08/08/19   Gold Top - SST   Result Value Ref Range    Extra Tube Hold for add-ons.    Green Top (Gel)   Result Value Ref Range    Extra Tube Hold for add-ons.    Urinalysis With Microscopic If Indicated (No Culture) - Urine, Clean Catch   Result Value Ref Range    Color, UA Yellow Yellow, Straw, Dark Yellow, Radha    Appearance, UA Clear Clear    pH, UA 6.5 5.0 - 9.0    Specific Gravity, UA 1.020 1.003 - 1.030    Glucose, UA Negative Negative    Ketones, UA Negative Negative    Bilirubin, UA Negative Negative    Blood, UA Negative Negative    Protein, UA Negative Negative    Leuk Esterase, UA Negative Negative    Nitrite, UA Negative Negative    Urobilinogen, UA 0.2 E.U./dL 0.2 - 1.0 E.U./dL   CBC Auto  Differential   Result Value Ref Range    WBC 8.77 3.40 - 10.80 10*3/mm3    RBC 5.09 3.77 - 5.28 10*6/mm3    Hemoglobin 14.0 12.0 - 15.9 g/dL    Hematocrit 43.8 34.0 - 46.6 %    MCV 86.1 79.0 - 97.0 fL    MCH 27.5 26.6 - 33.0 pg    MCHC 32.0 31.5 - 35.7 g/dL    RDW 14.1 12.3 - 15.4 %    RDW-SD 44.0 37.0 - 54.0 fl    MPV 10.4 6.0 - 12.0 fL    Platelets 343 140 - 450 10*3/mm3    Neutrophil % 66.1 42.7 - 76.0 %    Lymphocyte % 25.1 19.6 - 45.3 %    Monocyte % 6.5 5.0 - 12.0 %    Eosinophil % 1.5 0.3 - 6.2 %    Basophil % 0.5 0.0 - 1.5 %    Immature Grans % 0.3 0.0 - 0.5 %    Neutrophils, Absolute 5.80 1.70 - 7.00 10*3/mm3    Lymphocytes, Absolute 2.20 0.70 - 3.10 10*3/mm3    Monocytes, Absolute 0.57 0.10 - 0.90 10*3/mm3    Eosinophils, Absolute 0.13 0.00 - 0.40 10*3/mm3    Basophils, Absolute 0.04 0.00 - 0.20 10*3/mm3    Immature Grans, Absolute 0.03 0.00 - 0.05 10*3/mm3    nRBC 0.0 0.0 - 0.2 /100 WBC   Lavender Top   Result Value Ref Range    Extra Tube hold for add-on    Light Blue Top   Result Value Ref Range    Extra Tube hold for add-on    Lipase   Result Value Ref Range    Lipase 33 13 - 60 U/L   Comprehensive Metabolic Panel   Result Value Ref Range    Glucose 99 65 - 99 mg/dL    BUN 14 6 - 20 mg/dL    Creatinine 0.71 0.57 - 1.00 mg/dL    Sodium 140 136 - 145 mmol/L    Potassium 4.2 3.5 - 5.2 mmol/L    Chloride 103 98 - 107 mmol/L    CO2 27.0 22.0 - 29.0 mmol/L    Calcium 9.0 8.6 - 10.5 mg/dL    Total Protein 7.0 6.0 - 8.5 g/dL    Albumin 3.80 3.50 - 5.20 g/dL    ALT (SGPT) 21 1 - 33 U/L    AST (SGOT) 20 1 - 32 U/L    Alkaline Phosphatase 66 39 - 117 U/L    Total Bilirubin 0.3 0.2 - 1.2 mg/dL    eGFR Non African Amer 88 >60 mL/min/1.73    Globulin 3.2 gm/dL    A/G Ratio 1.2 g/dL    BUN/Creatinine Ratio 19.7 7.0 - 25.0    Anion Gap 10.0 5.0 - 15.0 mmol/L   Results for orders placed or performed during the hospital encounter of 12/04/18   Tissue Pathology Exam   Result Value Ref Range    Case Report       Surgical  Pathology Report                         Case: [id number]                                  Authorizing Provider:  Jaiyeola, Aderemi, MD      Collected:           12/04/2018 03:49 PM          Ordering Location:     [facility]             Received:            12/05/2018 07:51 AM                                 [facility]                                                     Pathologist:           Rian Brown MD                                                         Specimens:   1) - Small Intestine, Duodenum                                                                      2) - Gastric, Antrum, and body                                                                      3) - Large Intestine, colonic mucosa                                                       Final Diagnosis       1.  MUCOSA, DUODENUM:  NO SIGNIFICANT HISTOLOGIC ABNORMALITY.    2.  MUCOSA, ANTRUM AND BODY OF STOMACH:  REACTIVE GASTROPATHY.    3.  MUCOSA, COLON:  NO SIGNIFICANT HISTOLOGIC ABNORMALITY.      Gross Description       Received for examination are 3 containers, each of which have nodular bits of white soft tissue measuring 0.3-0.5 cc in aggregate.  All specimens are embedded as labeled.  1A duodenum; 2A antrum and body of stomach; 3A mucosa of colon.     KOH Prep - Brushing, Esophagus   Result Value Ref Range    KOH Prep No yeast or hyphal elements seen No yeast or hyphal elements seen

## 2019-09-17 NOTE — PATIENT INSTRUCTIONS

## 2019-09-26 ENCOUNTER — HOSPITAL ENCOUNTER (OUTPATIENT)
Dept: NUCLEAR MEDICINE | Facility: HOSPITAL | Age: 49
Discharge: HOME | End: 2019-09-26

## 2019-09-26 ENCOUNTER — DOCUMENTATION (OUTPATIENT)
Dept: GASTROENTEROLOGY | Facility: CLINIC | Age: 49
End: 2019-09-26

## 2019-09-26 DIAGNOSIS — R11.14 BILIOUS VOMITING WITH NAUSEA: ICD-10-CM

## 2019-09-26 PROCEDURE — 78264 GASTRIC EMPTYING IMG STUDY: CPT

## 2019-09-26 PROCEDURE — A9541 TC99M SULFUR COLLOID: HCPCS | Performed by: NURSE PRACTITIONER

## 2019-09-26 PROCEDURE — 0 TECHNETIUM SULFUR COLLOID: Performed by: NURSE PRACTITIONER

## 2019-09-26 RX ADMIN — TECHNETIUM TC 99M SULFUR COLLOID 1 DOSE: KIT at 07:29

## 2019-09-27 ENCOUNTER — TELEPHONE (OUTPATIENT)
Dept: GASTROENTEROLOGY | Facility: CLINIC | Age: 49
End: 2019-09-27

## 2019-09-27 NOTE — TELEPHONE ENCOUNTER
Voicemail message left with results of gastric emptying study        ----- Message from AUTUMN Soler sent at 9/26/2019  4:59 PM CDT -----  Please call patient with results

## 2020-03-17 ENCOUNTER — APPOINTMENT (OUTPATIENT)
Dept: GENERAL RADIOLOGY | Facility: HOSPITAL | Age: 50
End: 2020-03-17

## 2020-03-17 ENCOUNTER — HOSPITAL ENCOUNTER (EMERGENCY)
Facility: HOSPITAL | Age: 50
Discharge: HOME | End: 2020-03-17
Attending: EMERGENCY MEDICINE | Admitting: EMERGENCY MEDICINE

## 2020-03-17 VITALS
OXYGEN SATURATION: 97 % | TEMPERATURE: 98.4 F | HEIGHT: 68 IN | DIASTOLIC BLOOD PRESSURE: 70 MMHG | HEART RATE: 60 BPM | WEIGHT: 293 LBS | SYSTOLIC BLOOD PRESSURE: 150 MMHG | RESPIRATION RATE: 19 BRPM | BODY MASS INDEX: 44.41 KG/M2

## 2020-03-17 DIAGNOSIS — J44.1 COPD EXACERBATION (HCC): Primary | ICD-10-CM

## 2020-03-17 DIAGNOSIS — J45.901 ASTHMA WITH ACUTE EXACERBATION, UNSPECIFIED ASTHMA SEVERITY, UNSPECIFIED WHETHER PERSISTENT: ICD-10-CM

## 2020-03-17 LAB
ALBUMIN SERPL-MCNC: 3.1 G/DL (ref 3.5–5.2)
ALBUMIN/GLOB SERPL: 1.2 G/DL
ALP SERPL-CCNC: 55 U/L (ref 39–117)
ALT SERPL W P-5'-P-CCNC: 13 U/L (ref 1–33)
ANION GAP SERPL CALCULATED.3IONS-SCNC: 10 MMOL/L (ref 5–15)
AST SERPL-CCNC: 14 U/L (ref 1–32)
B PERT DNA SPEC QL NAA+PROBE: NOT DETECTED
BASOPHILS # BLD AUTO: 0.04 10*3/MM3 (ref 0–0.2)
BASOPHILS NFR BLD AUTO: 0.5 % (ref 0–1.5)
BILIRUB SERPL-MCNC: <0.2 MG/DL (ref 0.2–1.2)
BUN BLD-MCNC: 11 MG/DL (ref 6–20)
BUN/CREAT SERPL: 21.6 (ref 7–25)
C PNEUM DNA NPH QL NAA+NON-PROBE: NOT DETECTED
CALCIUM SPEC-SCNC: 7.1 MG/DL (ref 8.6–10.5)
CHLORIDE SERPL-SCNC: 113 MMOL/L (ref 98–107)
CO2 SERPL-SCNC: 20 MMOL/L (ref 22–29)
CREAT BLD-MCNC: 0.51 MG/DL (ref 0.57–1)
DEPRECATED RDW RBC AUTO: 45.2 FL (ref 37–54)
EOSINOPHIL # BLD AUTO: 0.06 10*3/MM3 (ref 0–0.4)
EOSINOPHIL NFR BLD AUTO: 0.8 % (ref 0.3–6.2)
ERYTHROCYTE [DISTWIDTH] IN BLOOD BY AUTOMATED COUNT: 14.1 % (ref 12.3–15.4)
FLUAV H1 2009 PAND RNA NPH QL NAA+PROBE: NOT DETECTED
FLUAV H1 HA GENE NPH QL NAA+PROBE: NOT DETECTED
FLUAV H3 RNA NPH QL NAA+PROBE: NOT DETECTED
FLUAV SUBTYP SPEC NAA+PROBE: NOT DETECTED
FLUBV RNA ISLT QL NAA+PROBE: NOT DETECTED
GFR SERPL CREATININE-BSD FRML MDRD: 128 ML/MIN/1.73
GLOBULIN UR ELPH-MCNC: 2.6 GM/DL
GLUCOSE BLD-MCNC: 112 MG/DL (ref 65–99)
HADV DNA SPEC NAA+PROBE: NOT DETECTED
HCOV 229E RNA SPEC QL NAA+PROBE: NOT DETECTED
HCOV HKU1 RNA SPEC QL NAA+PROBE: NOT DETECTED
HCOV NL63 RNA SPEC QL NAA+PROBE: NOT DETECTED
HCOV OC43 RNA SPEC QL NAA+PROBE: NOT DETECTED
HCT VFR BLD AUTO: 37.9 % (ref 34–46.6)
HGB BLD-MCNC: 12.2 G/DL (ref 12–15.9)
HMPV RNA NPH QL NAA+NON-PROBE: NOT DETECTED
HOLD SPECIMEN: NORMAL
HPIV1 RNA SPEC QL NAA+PROBE: NOT DETECTED
HPIV2 RNA SPEC QL NAA+PROBE: NOT DETECTED
HPIV3 RNA NPH QL NAA+PROBE: NOT DETECTED
HPIV4 P GENE NPH QL NAA+PROBE: NOT DETECTED
IMM GRANULOCYTES # BLD AUTO: 0.02 10*3/MM3 (ref 0–0.05)
IMM GRANULOCYTES NFR BLD AUTO: 0.3 % (ref 0–0.5)
LYMPHOCYTES # BLD AUTO: 1.97 10*3/MM3 (ref 0.7–3.1)
LYMPHOCYTES NFR BLD AUTO: 25.3 % (ref 19.6–45.3)
M PNEUMO IGG SER IA-ACNC: NOT DETECTED
MCH RBC QN AUTO: 27.9 PG (ref 26.6–33)
MCHC RBC AUTO-ENTMCNC: 32.2 G/DL (ref 31.5–35.7)
MCV RBC AUTO: 86.7 FL (ref 79–97)
MONOCYTES # BLD AUTO: 0.25 10*3/MM3 (ref 0.1–0.9)
MONOCYTES NFR BLD AUTO: 3.2 % (ref 5–12)
NEUTROPHILS # BLD AUTO: 5.46 10*3/MM3 (ref 1.7–7)
NEUTROPHILS NFR BLD AUTO: 69.9 % (ref 42.7–76)
NRBC BLD AUTO-RTO: 0 /100 WBC (ref 0–0.2)
PLATELET # BLD AUTO: 289 10*3/MM3 (ref 140–450)
PMV BLD AUTO: 10.7 FL (ref 6–12)
POTASSIUM BLD-SCNC: 3.5 MMOL/L (ref 3.5–5.2)
PROT SERPL-MCNC: 5.7 G/DL (ref 6–8.5)
RBC # BLD AUTO: 4.37 10*6/MM3 (ref 3.77–5.28)
RHINOVIRUS RNA SPEC NAA+PROBE: NOT DETECTED
RSV RNA NPH QL NAA+NON-PROBE: NOT DETECTED
S PYO AG THROAT QL: NEGATIVE
SODIUM BLD-SCNC: 143 MMOL/L (ref 136–145)
WBC NRBC COR # BLD: 7.8 10*3/MM3 (ref 3.4–10.8)

## 2020-03-17 PROCEDURE — 99284 EMERGENCY DEPT VISIT MOD MDM: CPT

## 2020-03-17 PROCEDURE — 71045 X-RAY EXAM CHEST 1 VIEW: CPT

## 2020-03-17 PROCEDURE — 96374 THER/PROPH/DIAG INJ IV PUSH: CPT

## 2020-03-17 PROCEDURE — 87880 STREP A ASSAY W/OPTIC: CPT | Performed by: EMERGENCY MEDICINE

## 2020-03-17 PROCEDURE — 87081 CULTURE SCREEN ONLY: CPT | Performed by: EMERGENCY MEDICINE

## 2020-03-17 PROCEDURE — 85025 COMPLETE CBC W/AUTO DIFF WBC: CPT | Performed by: EMERGENCY MEDICINE

## 2020-03-17 PROCEDURE — 80053 COMPREHEN METABOLIC PANEL: CPT | Performed by: EMERGENCY MEDICINE

## 2020-03-17 PROCEDURE — 93010 ELECTROCARDIOGRAM REPORT: CPT | Performed by: INTERNAL MEDICINE

## 2020-03-17 PROCEDURE — 25010000002 METHYLPREDNISOLONE PER 125 MG: Performed by: EMERGENCY MEDICINE

## 2020-03-17 PROCEDURE — 93005 ELECTROCARDIOGRAM TRACING: CPT | Performed by: EMERGENCY MEDICINE

## 2020-03-17 PROCEDURE — 0099U HC BIOFIRE FILMARRAY RESP PANEL 1: CPT | Performed by: EMERGENCY MEDICINE

## 2020-03-17 RX ORDER — SODIUM CHLORIDE 0.9 % (FLUSH) 0.9 %
10 SYRINGE (ML) INJECTION AS NEEDED
Status: DISCONTINUED | OUTPATIENT
Start: 2020-03-17 | End: 2020-03-18 | Stop reason: HOSPADM

## 2020-03-17 RX ORDER — IPRATROPIUM BROMIDE AND ALBUTEROL SULFATE 2.5; .5 MG/3ML; MG/3ML
3 SOLUTION RESPIRATORY (INHALATION)
Status: DISCONTINUED | OUTPATIENT
Start: 2020-03-17 | End: 2020-03-18 | Stop reason: HOSPADM

## 2020-03-17 RX ORDER — FLUTICASONE PROPIONATE 110 UG/1
2 AEROSOL, METERED RESPIRATORY (INHALATION)
Qty: 1 INHALER | Refills: 0 | Status: SHIPPED | OUTPATIENT
Start: 2020-03-17

## 2020-03-17 RX ORDER — ALBUTEROL SULFATE 90 UG/1
2 AEROSOL, METERED RESPIRATORY (INHALATION) EVERY 4 HOURS PRN
Qty: 1 INHALER | Refills: 0 | Status: SHIPPED | OUTPATIENT
Start: 2020-03-17

## 2020-03-17 RX ORDER — DEXTROMETHORPHAN HYDROBROMIDE AND PROMETHAZINE HYDROCHLORIDE 15; 6.25 MG/5ML; MG/5ML
5 SYRUP ORAL 4 TIMES DAILY PRN
Qty: 118 ML | Refills: 0 | Status: SHIPPED | OUTPATIENT
Start: 2020-03-17

## 2020-03-17 RX ORDER — PREDNISONE 20 MG/1
40 TABLET ORAL DAILY
Qty: 14 TABLET | Refills: 0 | Status: SHIPPED | OUTPATIENT
Start: 2020-03-17 | End: 2020-03-24

## 2020-03-17 RX ORDER — CLARITHROMYCIN 500 MG/1
500 TABLET, COATED ORAL 2 TIMES DAILY
Qty: 20 TABLET | Refills: 0 | Status: SHIPPED | OUTPATIENT
Start: 2020-03-17 | End: 2020-03-27

## 2020-03-17 RX ORDER — METHYLPREDNISOLONE SODIUM SUCCINATE 125 MG/2ML
125 INJECTION, POWDER, LYOPHILIZED, FOR SOLUTION INTRAMUSCULAR; INTRAVENOUS ONCE
Status: COMPLETED | OUTPATIENT
Start: 2020-03-17 | End: 2020-03-17

## 2020-03-17 RX ADMIN — METHYLPREDNISOLONE SODIUM SUCCINATE 125 MG: 125 INJECTION, POWDER, FOR SOLUTION INTRAMUSCULAR; INTRAVENOUS at 20:20

## 2020-03-18 NOTE — ED NOTES
Spoke to [facility] Department pt is not a candidate  for covid 19 testing at this time.     Naomi Cortes, RN  03/17/20 [id number]

## 2020-03-18 NOTE — ED NOTES
Called [facility] Department on call will call back      Naomi Cortes RN  03/17/20 [id number]

## 2020-03-18 NOTE — ED PROVIDER NOTES
Subjective   48yo female pmh significant asthma/chronic bronchitis presents ED via EMS c/o 1d hx nonproductive cough/congestion/wheezing/soa.  Pt seen urgent care prior to arrival with initial pulse oximetry demonstrating hypoxia.  Pt reports treated empirically 2wks previously for influenza.  ROS neg fever/rhinorrhea/sore throat/fever/n/v/d.      History provided by:  Patient and EMS personnel  URI   Presenting symptoms: congestion and cough    Presenting symptoms: no fever    Onset quality:  Sudden  Duration:  1 day  Timing:  Constant  Chronicity:  Recurrent  Associated symptoms: wheezing        Review of Systems   Constitutional: Negative for fever.   HENT: Positive for congestion.    Eyes: Negative for redness.   Respiratory: Positive for cough, shortness of breath and wheezing.    Cardiovascular: Negative.    Gastrointestinal: Negative.    Genitourinary: Negative.    Musculoskeletal: Negative.    Allergic/Immunologic: Negative for immunocompromised state.   All other systems reviewed and are negative.      Past Medical History:   Diagnosis Date   • Diarrhea    • Nausea        Allergies   Allergen Reactions   • Contrast Dye Anaphylaxis     Cardiac arrest        Past Surgical History:   Procedure Laterality Date   • BACK SURGERY  1992   • COLONOSCOPY N/A 12/4/2018    Procedure: COLONOSCOPY;  Surgeon: Jaiyeola, Aderemi, MD;  Location: [facility] ENDOSCOPY;  Service: Gastroenterology   • ENDOSCOPY N/A 12/4/2018    Procedure: ESOPHAGOGASTRODUODENOSCOPY;  Surgeon: Jaiyeola, Aderemi, MD;  Location: [facility] ENDOSCOPY;  Service: Gastroenterology   • HYSTERECTOMY  1991   • TONSILECTOMY, ADENOIDECTOMY, BILATERAL MYRINGOTOMY AND TUBES  1992       Family History   Problem Relation Age of Onset   • No Known Problems Mother    • Alcohol abuse Father    • No Known Problems Sister    • No Known Problems Brother    • No Known Problems Maternal Aunt    • No Known Problems Maternal Uncle    • No Known Problems Paternal Aunt    • No  Known Problems Paternal Uncle    • No Known Problems Maternal Grandmother    • No Known Problems Maternal Grandfather    • No Known Problems Paternal Grandmother    • No Known Problems Paternal Grandfather        Social History     Socioeconomic History   • Marital status:      Spouse name: Not on file   • Number of children: Not on file   • Years of education: Not on file   • Highest education level: Not on file   Tobacco Use   • Smoking status: Current Every Day Smoker     Packs/day: 0.50     Start date: 11/2/1988   • Smokeless tobacco: Never Used   Substance and Sexual Activity   • Alcohol use: Yes     Comment: social   • Drug use: No   • Sexual activity: Defer           Objective   Physical Exam   Constitutional: She is oriented to person, place, and time. She appears well-developed and well-nourished.   HENT:   Head: Normocephalic and atraumatic.   Right Ear: External ear normal.   Left Ear: External ear normal.   Nose: Nose normal.   Mouth/Throat: Oropharynx is clear and moist.   Eyes: Pupils are equal, round, and reactive to light.   Neck: Normal range of motion. Neck supple. No JVD present. No tracheal deviation present.   Cardiovascular: Normal rate, regular rhythm, normal heart sounds and intact distal pulses. Exam reveals no gallop and no friction rub.   No murmur heard.  Pulmonary/Chest: Effort normal. She has decreased breath sounds in the right lower field and the left lower field. She has wheezes in the right lower field and the left lower field. She has no rhonchi. She has no rales.   Abdominal: Soft. Bowel sounds are normal. She exhibits no distension and no mass. There is no tenderness. There is no rebound and no guarding.   Musculoskeletal: She exhibits no edema.   Lymphadenopathy:     She has no cervical adenopathy.   Neurological: She is alert and oriented to person, place, and time.   Skin: Skin is warm and dry.   Nursing note and vitals reviewed.      ECG 12 Lead    Date/Time:  3/17/2020 7:20 PM  Performed by: Iam Hastings MD  Authorized by: Iam Hastings MD   Interpreted by physician  Rhythm: sinus rhythm  Rate: normal  BPM: 63  QRS axis: normal  Conduction: conduction normal  ST Segments: ST segments normal  T Waves: T waves normal  Other: no other findings  Clinical impression: normal ECG                 ED Course      Labs Reviewed   COMPREHENSIVE METABOLIC PANEL - Abnormal; Notable for the following components:       Result Value    Glucose 112 (*)     Creatinine 0.51 (*)     Chloride 113 (*)     CO2 20.0 (*)     Calcium 7.1 (*)     Total Protein 5.7 (*)     Albumin 3.10 (*)     Total Bilirubin <0.2 (*)     All other components within normal limits    Narrative:     GFR Normal >60  Chronic Kidney Disease <60  Kidney Failure <15     CBC WITH AUTO DIFFERENTIAL - Abnormal; Notable for the following components:    Monocyte % 3.2 (*)     All other components within normal limits   RESPIRATORY PANEL, PCR - Normal    Narrative:     The coronavirus on the RVP is NOT COVID-19 and is NOT indicative of infection with COVID-19.    RAPID STREP A SCREEN - Normal   BETA HEMOLYTIC STREP CULTURE, THROAT   CBC AND DIFFERENTIAL    Narrative:     The following orders were created for panel order CBC & Differential.  Procedure                               Abnormality         Status                     ---------                               -----------         ------                     CBC Auto Differential[[id number]]        Abnormal            Final result                 Please view results for these tests on the individual orders.   EXTRA TUBES    Narrative:     The following orders were created for panel order Extra Tubes.  Procedure                               Abnormality         Status                     ---------                               -----------         ------                     Gold Top - [facility][[id number]]                                   In process                   Please view  results for these tests on the individual orders.   GOLD TOP - SST     Xr Chest 1 View    Result Date: 3/17/2020  Narrative: PORTABLE CHEST HISTORY: Shortness of air Portable AP upright film of the chest was obtained at 6:30 PM. COMPARISON: None FINDINGS: EKG leads. The lungs are clear of an acute process. The heart is not enlarged. The pulmonary vasculature is not increased. No pleural effusion. No pneumothorax. No acute osseous abnormality. Degenerative changes are present in the thoracic spine.     Impression: CONCLUSION: No Acute Disease [id number] Electronically signed by:  Justice Beck MD  3/17/2020 6:46 PM CDT Workstation: [id number]                                         MDM  Number of Diagnoses or Management Options  Asthma with acute exacerbation, unspecified asthma severity, unspecified whether persistent: minor  COPD exacerbation (CMS/HCC): minor  Diagnosis management comments: sao2 98% room air. Good air exchange. No evidence respiratory distress. (+) bronchospastic cough. cxr no active disease. Labs unremarkable. [facility] states no indication covid-19 lab testing. Stable discharge. Will treat with biaxin/prednisone/b2 agonist mdi/inhaled steroid.       Amount and/or Complexity of Data Reviewed  Clinical lab tests: reviewed  Tests in the radiology section of CPT®: reviewed  Tests in the medicine section of CPT®: reviewed        Final diagnoses:   COPD exacerbation (CMS/HCC)   Asthma with acute exacerbation, unspecified asthma severity, unspecified whether persistent            Iam Hastings MD  03/17/20 [id number]

## 2020-03-19 LAB — BACTERIA SPEC AEROBE CULT: NORMAL

## (undated) DEVICE — CANN SMPL SOFTECH BIFLO ETCO2 A/M 7FT

## (undated) DEVICE — SINGLE-USE BIOPSY FORCEPS: Brand: RADIAL JAW 4

## (undated) DEVICE — BITEBLOCK ENDO W/STRAP 60F A/ LF DISP

## (undated) DEVICE — CONMED COLONOSCOPE SHEATHED CYTOLOGY BRUSH, RING HANDLE, 3 MM X 230 CM: Brand: CONMED